# Patient Record
Sex: MALE | Race: WHITE | NOT HISPANIC OR LATINO | Employment: OTHER | URBAN - METROPOLITAN AREA
[De-identification: names, ages, dates, MRNs, and addresses within clinical notes are randomized per-mention and may not be internally consistent; named-entity substitution may affect disease eponyms.]

---

## 2021-07-30 ENCOUNTER — LAB REQUISITION (OUTPATIENT)
Dept: LAB | Facility: HOSPITAL | Age: 71
End: 2021-07-30
Payer: COMMERCIAL

## 2021-07-30 DIAGNOSIS — K63.5 POLYP OF COLON: ICD-10-CM

## 2021-07-30 PROCEDURE — 88305 TISSUE EXAM BY PATHOLOGIST: CPT | Performed by: PATHOLOGY

## 2022-01-26 ENCOUNTER — APPOINTMENT (INPATIENT)
Dept: NON INVASIVE DIAGNOSTICS | Facility: HOSPITAL | Age: 72
DRG: 247 | End: 2022-01-26
Payer: COMMERCIAL

## 2022-01-26 ENCOUNTER — HOSPITAL ENCOUNTER (INPATIENT)
Facility: HOSPITAL | Age: 72
LOS: 1 days | Discharge: HOME HEALTH | DRG: 247 | End: 2022-01-27
Attending: EMERGENCY MEDICINE | Admitting: FAMILY MEDICINE
Payer: COMMERCIAL

## 2022-01-26 ENCOUNTER — APPOINTMENT (EMERGENCY)
Dept: RADIOLOGY | Facility: HOSPITAL | Age: 72
DRG: 247 | End: 2022-01-26
Payer: COMMERCIAL

## 2022-01-26 DIAGNOSIS — I25.119 CORONARY ARTERY DISEASE INVOLVING NATIVE CORONARY ARTERY OF NATIVE HEART WITH ANGINA PECTORIS (HCC): ICD-10-CM

## 2022-01-26 DIAGNOSIS — I21.4 NSTEMI (NON-ST ELEVATED MYOCARDIAL INFARCTION) (HCC): Primary | ICD-10-CM

## 2022-01-26 DIAGNOSIS — Z95.5 S/P CORONARY ARTERY STENT PLACEMENT: ICD-10-CM

## 2022-01-26 DIAGNOSIS — G47.33 OSA (OBSTRUCTIVE SLEEP APNEA): ICD-10-CM

## 2022-01-26 PROBLEM — E11.9 TYPE 2 DIABETES MELLITUS, WITHOUT LONG-TERM CURRENT USE OF INSULIN (HCC): Status: ACTIVE | Noted: 2022-01-26

## 2022-01-26 PROBLEM — N17.9 AKI (ACUTE KIDNEY INJURY) (HCC): Status: ACTIVE | Noted: 2022-01-26

## 2022-01-26 PROBLEM — N18.9 CKD (CHRONIC KIDNEY DISEASE): Status: ACTIVE | Noted: 2022-01-26

## 2022-01-26 PROBLEM — N40.0 BPH (BENIGN PROSTATIC HYPERPLASIA): Status: ACTIVE | Noted: 2022-01-26

## 2022-01-26 LAB
2HR DELTA HS TROPONIN: 676 NG/L
ALBUMIN SERPL BCP-MCNC: 3.3 G/DL (ref 3.5–5)
ALP SERPL-CCNC: 119 U/L (ref 46–116)
ALT SERPL W P-5'-P-CCNC: 41 U/L (ref 12–78)
ANION GAP SERPL CALCULATED.3IONS-SCNC: 8 MMOL/L (ref 4–13)
AORTIC ROOT: 3.4 CM
AORTIC VALVE MEAN VELOCITY: 10.2 M/S
APICAL FOUR CHAMBER EJECTION FRACTION: 67 %
APTT PPP: 26 SECONDS (ref 23–37)
ASCENDING AORTA: 3.5 CM (ref 2.28–3.42)
AST SERPL W P-5'-P-CCNC: 22 U/L (ref 5–45)
ATRIAL RATE: 64 BPM
ATRIAL RATE: 69 BPM
ATRIAL RATE: 77 BPM
AV LVOT MEAN GRADIENT: 2 MMHG
AV LVOT PEAK GRADIENT: 4 MMHG
AV MEAN GRADIENT: 5 MMHG
AV PEAK GRADIENT: 7 MMHG
AV VELOCITY RATIO: 0.78
BASOPHILS # BLD AUTO: 0.06 THOUSANDS/ΜL (ref 0–0.1)
BASOPHILS NFR BLD AUTO: 1 % (ref 0–1)
BILIRUB SERPL-MCNC: 0.92 MG/DL (ref 0.2–1)
BUN SERPL-MCNC: 22 MG/DL (ref 5–25)
CALCIUM ALBUM COR SERPL-MCNC: 9.3 MG/DL (ref 8.3–10.1)
CALCIUM SERPL-MCNC: 8.7 MG/DL (ref 8.3–10.1)
CARDIAC TROPONIN I PNL SERPL HS: 279 NG/L
CARDIAC TROPONIN I PNL SERPL HS: 955 NG/L
CHLORIDE SERPL-SCNC: 104 MMOL/L (ref 100–108)
CO2 SERPL-SCNC: 28 MMOL/L (ref 21–32)
CREAT SERPL-MCNC: 1.34 MG/DL (ref 0.6–1.3)
DOP CALC AO PEAK VEL: 1.34 M/S
DOP CALC AO VTI: 27.67 CM
DOP CALC LVOT PEAK VEL VTI: 23.23 CM
DOP CALC LVOT PEAK VEL: 1.05 M/S
E WAVE DECELERATION TIME: 202 MS
EOSINOPHIL # BLD AUTO: 0.17 THOUSAND/ΜL (ref 0–0.61)
EOSINOPHIL NFR BLD AUTO: 2 % (ref 0–6)
ERYTHROCYTE [DISTWIDTH] IN BLOOD BY AUTOMATED COUNT: 12.5 % (ref 11.6–15.1)
ERYTHROCYTE [DISTWIDTH] IN BLOOD BY AUTOMATED COUNT: 12.5 % (ref 11.6–15.1)
FLUAV RNA RESP QL NAA+PROBE: NEGATIVE
FLUBV RNA RESP QL NAA+PROBE: NEGATIVE
FRACTIONAL SHORTENING: 32 % (ref 28–44)
GFR SERPL CREATININE-BSD FRML MDRD: 53 ML/MIN/1.73SQ M
GLUCOSE SERPL-MCNC: 110 MG/DL (ref 65–140)
GLUCOSE SERPL-MCNC: 127 MG/DL (ref 65–140)
GLUCOSE SERPL-MCNC: 140 MG/DL (ref 65–140)
GLUCOSE SERPL-MCNC: 156 MG/DL (ref 65–140)
GLUCOSE SERPL-MCNC: 232 MG/DL (ref 65–140)
HCT VFR BLD AUTO: 42.4 % (ref 36.5–49.3)
HCT VFR BLD AUTO: 42.7 % (ref 36.5–49.3)
HGB BLD-MCNC: 14.5 G/DL (ref 12–17)
HGB BLD-MCNC: 14.5 G/DL (ref 12–17)
IMM GRANULOCYTES # BLD AUTO: 0.03 THOUSAND/UL (ref 0–0.2)
IMM GRANULOCYTES NFR BLD AUTO: 0 % (ref 0–2)
INR PPP: 1.01 (ref 0.84–1.19)
INTERVENTRICULAR SEPTUM IN DIASTOLE (PARASTERNAL SHORT AXIS VIEW): 1.2 CM (ref 0.59–1.1)
KCT BLD-ACNC: 245 SEC (ref 89–137)
LEFT ATRIUM AREA SYSTOLE SINGLE PLANE A4C: 15 CM2
LEFT ATRIUM SIZE: 4 CM
LEFT INTERNAL DIMENSION IN SYSTOLE: 3.4 CM (ref 2.1–4)
LEFT VENTRICULAR INTERNAL DIMENSION IN DIASTOLE: 5 CM (ref 12.7–18.94)
LEFT VENTRICULAR POSTERIOR WALL IN END DIASTOLE: 1.2 CM (ref 0.57–1.08)
LEFT VENTRICULAR STROKE VOLUME: 69 ML
LYMPHOCYTES # BLD AUTO: 1.37 THOUSANDS/ΜL (ref 0.6–4.47)
LYMPHOCYTES NFR BLD AUTO: 14 % (ref 14–44)
MCH RBC QN AUTO: 31.7 PG (ref 26.8–34.3)
MCH RBC QN AUTO: 32.2 PG (ref 26.8–34.3)
MCHC RBC AUTO-ENTMCNC: 34 G/DL (ref 31.4–37.4)
MCHC RBC AUTO-ENTMCNC: 34.2 G/DL (ref 31.4–37.4)
MCV RBC AUTO: 93 FL (ref 82–98)
MCV RBC AUTO: 94 FL (ref 82–98)
MONOCYTES # BLD AUTO: 0.59 THOUSAND/ΜL (ref 0.17–1.22)
MONOCYTES NFR BLD AUTO: 6 % (ref 4–12)
MV E'TISSUE VEL-SEP: 9 CM/S
MV PEAK A VEL: 0.47 M/S
MV PEAK E VEL: 74 CM/S
MV STENOSIS PRESSURE HALF TIME: 0 MS
NEUTROPHILS # BLD AUTO: 7.52 THOUSANDS/ΜL (ref 1.85–7.62)
NEUTS SEG NFR BLD AUTO: 77 % (ref 43–75)
NRBC BLD AUTO-RTO: 0 /100 WBCS
NT-PROBNP SERPL-MCNC: 54 PG/ML
P AXIS: 43 DEGREES
P AXIS: 44 DEGREES
P AXIS: 49 DEGREES
PLATELET # BLD AUTO: 139 THOUSANDS/UL (ref 149–390)
PLATELET # BLD AUTO: 142 THOUSANDS/UL (ref 149–390)
PMV BLD AUTO: 10.3 FL (ref 8.9–12.7)
PMV BLD AUTO: 10.3 FL (ref 8.9–12.7)
POTASSIUM SERPL-SCNC: 4.6 MMOL/L (ref 3.5–5.3)
PR INTERVAL: 168 MS
PR INTERVAL: 172 MS
PR INTERVAL: 178 MS
PROT SERPL-MCNC: 6.5 G/DL (ref 6.4–8.2)
PROTHROMBIN TIME: 12.9 SECONDS (ref 11.6–14.5)
QRS AXIS: 10 DEGREES
QRS AXIS: 18 DEGREES
QRS AXIS: 5 DEGREES
QRSD INTERVAL: 88 MS
QRSD INTERVAL: 92 MS
QRSD INTERVAL: 94 MS
QT INTERVAL: 330 MS
QT INTERVAL: 364 MS
QT INTERVAL: 366 MS
QTC INTERVAL: 373 MS
QTC INTERVAL: 377 MS
QTC INTERVAL: 390 MS
RBC # BLD AUTO: 4.51 MILLION/UL (ref 3.88–5.62)
RBC # BLD AUTO: 4.58 MILLION/UL (ref 3.88–5.62)
RIGHT ATRIUM AREA SYSTOLE A4C: 16.2 CM2
RIGHT VENTRICLE ID DIMENSION: 4 CM
RSV RNA RESP QL NAA+PROBE: NEGATIVE
SARS-COV-2 RNA RESP QL NAA+PROBE: NEGATIVE
SL CV LV EF: 60
SL CV PED ECHO LEFT VENTRICLE DIASTOLIC VOLUME (MOD BIPLANE) 2D: 116 ML
SL CV PED ECHO LEFT VENTRICLE SYSTOLIC VOLUME (MOD BIPLANE) 2D: 47 ML
SODIUM SERPL-SCNC: 140 MMOL/L (ref 136–145)
SPECIMEN SOURCE: ABNORMAL
T WAVE AXIS: -17 DEGREES
T WAVE AXIS: -21 DEGREES
T WAVE AXIS: -27 DEGREES
VENTRICULAR RATE: 64 BPM
VENTRICULAR RATE: 69 BPM
VENTRICULAR RATE: 77 BPM
WBC # BLD AUTO: 9.63 THOUSAND/UL (ref 4.31–10.16)
WBC # BLD AUTO: 9.74 THOUSAND/UL (ref 4.31–10.16)
Z-SCORE OF ASCENDING AORTA: 2.26
Z-SCORE OF INTERVENTRICULAR SEPTUM IN END DIASTOLE: 2.74
Z-SCORE OF LEFT VENTRICULAR DIMENSION IN END SYSTOLE: -11.32
Z-SCORE OF LEFT VENTRICULAR POSTERIOR WALL IN END DIASTOLE: 2.85

## 2022-01-26 PROCEDURE — 94760 N-INVAS EAR/PLS OXIMETRY 1: CPT

## 2022-01-26 PROCEDURE — C1769 GUIDE WIRE: HCPCS | Performed by: INTERNAL MEDICINE

## 2022-01-26 PROCEDURE — 82948 REAGENT STRIP/BLOOD GLUCOSE: CPT

## 2022-01-26 PROCEDURE — C9600 PERC DRUG-EL COR STENT SING: HCPCS | Performed by: INTERNAL MEDICINE

## 2022-01-26 PROCEDURE — 71045 X-RAY EXAM CHEST 1 VIEW: CPT

## 2022-01-26 PROCEDURE — 94660 CPAP INITIATION&MGMT: CPT

## 2022-01-26 PROCEDURE — 92928 PRQ TCAT PLMT NTRAC ST 1 LES: CPT | Performed by: INTERNAL MEDICINE

## 2022-01-26 PROCEDURE — 93571 IV DOP VEL&/PRESS C FLO 1ST: CPT | Performed by: INTERNAL MEDICINE

## 2022-01-26 PROCEDURE — C1874 STENT, COATED/COV W/DEL SYS: HCPCS | Performed by: INTERNAL MEDICINE

## 2022-01-26 PROCEDURE — 84484 ASSAY OF TROPONIN QUANT: CPT | Performed by: PHYSICIAN ASSISTANT

## 2022-01-26 PROCEDURE — 99152 MOD SED SAME PHYS/QHP 5/>YRS: CPT | Performed by: INTERNAL MEDICINE

## 2022-01-26 PROCEDURE — 93005 ELECTROCARDIOGRAM TRACING: CPT

## 2022-01-26 PROCEDURE — 85610 PROTHROMBIN TIME: CPT | Performed by: INTERNAL MEDICINE

## 2022-01-26 PROCEDURE — 99223 1ST HOSP IP/OBS HIGH 75: CPT | Performed by: INTERNAL MEDICINE

## 2022-01-26 PROCEDURE — 99285 EMERGENCY DEPT VISIT HI MDM: CPT | Performed by: PHYSICIAN ASSISTANT

## 2022-01-26 PROCEDURE — 93306 TTE W/DOPPLER COMPLETE: CPT | Performed by: INTERNAL MEDICINE

## 2022-01-26 PROCEDURE — C1894 INTRO/SHEATH, NON-LASER: HCPCS | Performed by: INTERNAL MEDICINE

## 2022-01-26 PROCEDURE — 93010 ELECTROCARDIOGRAM REPORT: CPT | Performed by: INTERNAL MEDICINE

## 2022-01-26 PROCEDURE — 83880 ASSAY OF NATRIURETIC PEPTIDE: CPT | Performed by: PHYSICIAN ASSISTANT

## 2022-01-26 PROCEDURE — C1725 CATH, TRANSLUMIN NON-LASER: HCPCS | Performed by: INTERNAL MEDICINE

## 2022-01-26 PROCEDURE — 99223 1ST HOSP IP/OBS HIGH 75: CPT | Performed by: FAMILY MEDICINE

## 2022-01-26 PROCEDURE — 99285 EMERGENCY DEPT VISIT HI MDM: CPT

## 2022-01-26 PROCEDURE — 93454 CORONARY ARTERY ANGIO S&I: CPT | Performed by: INTERNAL MEDICINE

## 2022-01-26 PROCEDURE — 027034Z DILATION OF CORONARY ARTERY, ONE ARTERY WITH DRUG-ELUTING INTRALUMINAL DEVICE, PERCUTANEOUS APPROACH: ICD-10-PCS | Performed by: INTERNAL MEDICINE

## 2022-01-26 PROCEDURE — 85347 COAGULATION TIME ACTIVATED: CPT

## 2022-01-26 PROCEDURE — 93306 TTE W/DOPPLER COMPLETE: CPT

## 2022-01-26 PROCEDURE — 99153 MOD SED SAME PHYS/QHP EA: CPT | Performed by: INTERNAL MEDICINE

## 2022-01-26 PROCEDURE — 85730 THROMBOPLASTIN TIME PARTIAL: CPT | Performed by: INTERNAL MEDICINE

## 2022-01-26 PROCEDURE — C1887 CATHETER, GUIDING: HCPCS | Performed by: INTERNAL MEDICINE

## 2022-01-26 PROCEDURE — 85027 COMPLETE CBC AUTOMATED: CPT | Performed by: INTERNAL MEDICINE

## 2022-01-26 PROCEDURE — B211YZZ FLUOROSCOPY OF MULTIPLE CORONARY ARTERIES USING OTHER CONTRAST: ICD-10-PCS | Performed by: INTERNAL MEDICINE

## 2022-01-26 PROCEDURE — 80053 COMPREHEN METABOLIC PANEL: CPT | Performed by: PHYSICIAN ASSISTANT

## 2022-01-26 PROCEDURE — 4A023N7 MEASUREMENT OF CARDIAC SAMPLING AND PRESSURE, LEFT HEART, PERCUTANEOUS APPROACH: ICD-10-PCS | Performed by: INTERNAL MEDICINE

## 2022-01-26 PROCEDURE — 36415 COLL VENOUS BLD VENIPUNCTURE: CPT | Performed by: PHYSICIAN ASSISTANT

## 2022-01-26 PROCEDURE — 0241U HB NFCT DS VIR RESP RNA 4 TRGT: CPT | Performed by: PHYSICIAN ASSISTANT

## 2022-01-26 PROCEDURE — 85025 COMPLETE CBC W/AUTO DIFF WBC: CPT | Performed by: PHYSICIAN ASSISTANT

## 2022-01-26 DEVICE — XIENCE SKYPOINT™ EVEROLIMUS ELUTING CORONARY STENT SYSTEM 4.00 MM X 28 MM / RAPID-EXCHANGE
Type: IMPLANTABLE DEVICE | Site: CORONARY | Status: FUNCTIONAL
Brand: XIENCE SKYPOINT™

## 2022-01-26 RX ORDER — OXYBUTYNIN CHLORIDE 5 MG/1
5 TABLET ORAL 2 TIMES DAILY
Status: DISCONTINUED | OUTPATIENT
Start: 2022-01-26 | End: 2022-01-27 | Stop reason: HOSPADM

## 2022-01-26 RX ORDER — TIMOLOL MALEATE 5 MG/ML
1 SOLUTION/ DROPS OPHTHALMIC 3 TIMES DAILY
Status: DISCONTINUED | OUTPATIENT
Start: 2022-01-26 | End: 2022-01-27 | Stop reason: HOSPADM

## 2022-01-26 RX ORDER — METOPROLOL SUCCINATE 25 MG/1
25 TABLET, EXTENDED RELEASE ORAL DAILY
Status: DISCONTINUED | OUTPATIENT
Start: 2022-01-26 | End: 2022-01-27 | Stop reason: HOSPADM

## 2022-01-26 RX ORDER — MIDAZOLAM HYDROCHLORIDE 2 MG/2ML
INJECTION, SOLUTION INTRAMUSCULAR; INTRAVENOUS AS NEEDED
Status: DISCONTINUED | OUTPATIENT
Start: 2022-01-26 | End: 2022-01-26 | Stop reason: HOSPADM

## 2022-01-26 RX ORDER — FENTANYL CITRATE 50 UG/ML
INJECTION, SOLUTION INTRAMUSCULAR; INTRAVENOUS AS NEEDED
Status: DISCONTINUED | OUTPATIENT
Start: 2022-01-26 | End: 2022-01-26 | Stop reason: HOSPADM

## 2022-01-26 RX ORDER — NITROGLYCERIN 20 MG/100ML
INJECTION INTRAVENOUS AS NEEDED
Status: DISCONTINUED | OUTPATIENT
Start: 2022-01-26 | End: 2022-01-26 | Stop reason: HOSPADM

## 2022-01-26 RX ORDER — ATORVASTATIN CALCIUM 40 MG/1
40 TABLET, FILM COATED ORAL EVERY EVENING
Status: DISCONTINUED | OUTPATIENT
Start: 2022-01-26 | End: 2022-01-27 | Stop reason: HOSPADM

## 2022-01-26 RX ORDER — HEPARIN SODIUM 1000 [USP'U]/ML
4000 INJECTION, SOLUTION INTRAVENOUS; SUBCUTANEOUS ONCE
Status: COMPLETED | OUTPATIENT
Start: 2022-01-26 | End: 2022-01-26

## 2022-01-26 RX ORDER — ASPIRIN 81 MG/1
81 TABLET, CHEWABLE ORAL DAILY
Status: DISCONTINUED | OUTPATIENT
Start: 2022-01-27 | End: 2022-01-27 | Stop reason: HOSPADM

## 2022-01-26 RX ORDER — ONDANSETRON 2 MG/ML
4 INJECTION INTRAMUSCULAR; INTRAVENOUS EVERY 6 HOURS PRN
Status: DISCONTINUED | OUTPATIENT
Start: 2022-01-26 | End: 2022-01-27 | Stop reason: HOSPADM

## 2022-01-26 RX ORDER — LIDOCAINE WITH 8.4% SOD BICARB 0.9%(10ML)
SYRINGE (ML) INJECTION AS NEEDED
Status: DISCONTINUED | OUTPATIENT
Start: 2022-01-26 | End: 2022-01-26 | Stop reason: HOSPADM

## 2022-01-26 RX ORDER — HEPARIN SODIUM 1000 [USP'U]/ML
4000 INJECTION, SOLUTION INTRAVENOUS; SUBCUTANEOUS
Status: DISCONTINUED | OUTPATIENT
Start: 2022-01-26 | End: 2022-01-26

## 2022-01-26 RX ORDER — ACETAMINOPHEN 325 MG/1
650 TABLET ORAL EVERY 6 HOURS PRN
Status: DISCONTINUED | OUTPATIENT
Start: 2022-01-26 | End: 2022-01-27 | Stop reason: HOSPADM

## 2022-01-26 RX ORDER — SODIUM CHLORIDE 9 MG/ML
125 INJECTION, SOLUTION INTRAVENOUS CONTINUOUS
Status: DISPENSED | OUTPATIENT
Start: 2022-01-26 | End: 2022-01-26

## 2022-01-26 RX ORDER — NITROGLYCERIN 0.4 MG/1
0.4 TABLET SUBLINGUAL
Status: DISCONTINUED | OUTPATIENT
Start: 2022-01-26 | End: 2022-01-27 | Stop reason: HOSPADM

## 2022-01-26 RX ORDER — HEPARIN SODIUM 1000 [USP'U]/ML
INJECTION, SOLUTION INTRAVENOUS; SUBCUTANEOUS AS NEEDED
Status: DISCONTINUED | OUTPATIENT
Start: 2022-01-26 | End: 2022-01-26 | Stop reason: HOSPADM

## 2022-01-26 RX ORDER — SODIUM CHLORIDE 9 MG/ML
75 INJECTION, SOLUTION INTRAVENOUS CONTINUOUS
Status: DISCONTINUED | OUTPATIENT
Start: 2022-01-26 | End: 2022-01-26

## 2022-01-26 RX ORDER — HEPARIN SODIUM 10000 [USP'U]/100ML
3-20 INJECTION, SOLUTION INTRAVENOUS
Status: DISCONTINUED | OUTPATIENT
Start: 2022-01-26 | End: 2022-01-26

## 2022-01-26 RX ORDER — SODIUM CHLORIDE 9 MG/ML
125 INJECTION, SOLUTION INTRAVENOUS CONTINUOUS
Status: DISCONTINUED | OUTPATIENT
Start: 2022-01-26 | End: 2022-01-26

## 2022-01-26 RX ORDER — HYDRALAZINE HYDROCHLORIDE 20 MG/ML
5 INJECTION INTRAMUSCULAR; INTRAVENOUS EVERY 6 HOURS PRN
Status: DISCONTINUED | OUTPATIENT
Start: 2022-01-26 | End: 2022-01-27 | Stop reason: HOSPADM

## 2022-01-26 RX ORDER — HEPARIN SODIUM 1000 [USP'U]/ML
2000 INJECTION, SOLUTION INTRAVENOUS; SUBCUTANEOUS
Status: DISCONTINUED | OUTPATIENT
Start: 2022-01-26 | End: 2022-01-26

## 2022-01-26 RX ORDER — ACETAMINOPHEN 325 MG/1
TABLET ORAL
Status: COMPLETED
Start: 2022-01-26 | End: 2022-01-26

## 2022-01-26 RX ADMIN — ACETAMINOPHEN 650 MG: 325 TABLET, FILM COATED ORAL at 17:13

## 2022-01-26 RX ADMIN — ATORVASTATIN CALCIUM 40 MG: 40 TABLET, FILM COATED ORAL at 18:31

## 2022-01-26 RX ADMIN — SODIUM CHLORIDE 75 ML/HR: 0.9 INJECTION, SOLUTION INTRAVENOUS at 09:54

## 2022-01-26 RX ADMIN — HEPARIN SODIUM AND DEXTROSE 11.1 UNITS/KG/HR: 10000; 5 INJECTION INTRAVENOUS at 06:36

## 2022-01-26 RX ADMIN — SODIUM CHLORIDE 125 ML/HR: 0.9 INJECTION, SOLUTION INTRAVENOUS at 18:06

## 2022-01-26 RX ADMIN — OXYBUTYNIN CHLORIDE 5 MG: 5 TABLET ORAL at 18:32

## 2022-01-26 RX ADMIN — ACETAMINOPHEN 650 MG: 325 TABLET ORAL at 17:13

## 2022-01-26 RX ADMIN — SODIUM CHLORIDE 125 ML/HR: 0.9 INJECTION, SOLUTION INTRAVENOUS at 14:28

## 2022-01-26 RX ADMIN — TIMOLOL MALEATE 1 DROP: 5 SOLUTION/ DROPS OPHTHALMIC at 21:56

## 2022-01-26 RX ADMIN — HEPARIN SODIUM 4000 UNITS: 1000 INJECTION INTRAVENOUS; SUBCUTANEOUS at 06:37

## 2022-01-26 RX ADMIN — OXYBUTYNIN CHLORIDE 5 MG: 5 TABLET ORAL at 09:14

## 2022-01-26 RX ADMIN — TICAGRELOR 90 MG: 90 TABLET ORAL at 21:55

## 2022-01-26 RX ADMIN — BISMUTH SUBSALICYLATE 524 MG: 525 LIQUID ORAL at 17:31

## 2022-01-26 RX ADMIN — METOPROLOL SUCCINATE 25 MG: 25 TABLET, FILM COATED, EXTENDED RELEASE ORAL at 09:14

## 2022-01-26 NOTE — ASSESSMENT & PLAN NOTE
Lab Results   Component Value Date    EGFR 53 01/26/2022    CREATININE 1 34 (H) 01/26/2022     · Patients creatinine mildly elevated at 1 34 on admission  · Per chart review baseline appears to be 1 0 (12/1/21)  · Does not meet YEMI criteria at this time  · Avoid nephrotoxic agents and hypotension  · Monitor closely with BMP

## 2022-01-26 NOTE — DISCHARGE INSTRUCTIONS
After Radial Heart Catheterization   WHAT YOU NEED TO KNOW:   What will happen after a radial heart catheterization? · You will be attached to a heart monitor until you are fully awake  A heart monitor is an EKG that stays on continuously to record your heart's electrical activity  Healthcare providers will monitor your vital signs and pulses in your arm  They will frequently check your pressure bandage for bleeding or swelling  · You may have a band wrapped tightly around your wrist  The band puts pressure on your wound and helps prevent bleeding  A healthcare provider can put air into the band or remove air from the band  A healthcare provider will gradually remove air from the band and decrease pressure on your wrist  The band may be removed in 2 hours or when your wound stops bleeding  · You will need to keep your wrist straight for 2 to 4 hours  Do not  push or pull with your arm  Arm movements can cause serious bleeding  After you are monitored for several hours, you may go home or may need to stay in the hospital overnight  What do I need to know before I go home? · Care for your wound as directed  Remove the pressure bandage in 24 hours or as directed  Mild bruising is normal and expected  A small bandage can be placed on your wound after you remove the pressure bandage  Do not put powders, lotions, or creams on your wound  They may cause your wound to get infected  Monitor your wound every day for signs of infection, such as redness, swelling, or pus  · Shower the day after your procedure or as directed  Remove your pressure bandage before you shower  Do not take baths or go in hot tubs or pools  Carefully wash the wound with soap and water  Pat the area dry  A small bandage can be placed on your wound after you shower  · Apply firm, steady pressure to your wound if it bleeds  Apply pressure with a clean gauze or towel for 5 to 10 minutes   Call 911 if bleeding becomes heavy or does not stop  · Drink liquids as directed  Liquids will help flush the contrast liquid from your body  Ask how much liquid to drink each day and which liquids are best for you  · Do not lift anything heavier than 5 pounds until directed by your healthcare provider  Heavy lifting can put stress on your wound and cause bleeding  Do not push or pull with the arm that was used for the procedure  Do not do vigorous activity for at least 48 hours  Vigorous activity may cause bleeding from your wound  Rest and do quiet activities  Take short walks around the house to prevent a blood clot  Ask your healthcare provider when you can return to your normal activities  · Do not drive or return to work until your healthcare provider says it is okay  Your healthcare provider may tell you to wait 48 hours before you drive to decrease your risk for bleeding  You may not be able to return to work for at least 2 days after your procedure if your job involves heavy lifting  What medicines may I need? You may need any of the following:  · Blood thinners  help prevent blood clots  Clots can cause strokes, heart attacks, and death  The following are general safety guidelines to follow while you are taking a blood thinner:    ? Watch for bleeding and bruising while you take blood thinners  Watch for bleeding from your gums or nose  Watch for blood in your urine and bowel movements  Use a soft washcloth on your skin, and a soft toothbrush to brush your teeth  This can keep your skin and gums from bleeding  If you shave, use an electric shaver  Do not play contact sports  ? Tell your dentist and other healthcare providers that you take a blood thinner  Wear a bracelet or necklace that says you take this medicine  ? Do not start or stop any other medicines unless your healthcare provider tells you to  Many medicines cannot be used with blood thinners      ? Take your blood thinner exactly as prescribed by your healthcare provider  Do not skip does or take less than prescribed  Tell your provider right away if you forget to take your blood thinner, or if you take too much  ? Warfarin  is a blood thinner that you may need to take  The following are things you should be aware of if you take warfarin:     § Foods and medicines can affect the amount of warfarin in your blood  Do not make major changes to your diet while you take warfarin  Warfarin works best when you eat about the same amount of vitamin K every day  Vitamin K is found in green leafy vegetables and certain other foods  Ask for more information about what to eat when you are taking warfarin  § You will need to see your healthcare provider for follow-up visits when you are on warfarin  You will need regular blood tests  These tests are used to decide how much medicine you need  · Acetaminophen  helps decrease pain and fever  This medicine is available without a doctor's order  Ask how much medicine is safe to take, and how often to take it  Acetaminophen can cause liver damage if not taken correctly  · Take your medicine as directed  Contact your healthcare provider if you think your medicine is not helping or if you have side effects  Tell him or her if you are allergic to any medicine  Keep a list of the medicines, vitamins, and herbs you take  Include the amounts, and when and why you take them  Bring the list or the pill bottles to follow-up visits  Carry your medicine list with you in case of an emergency  Call your local emergency number (911 in the [address]) if:   · You have chest pain  · You have any of the following signs of a heart attack:      ?  Squeezing, pressure, or pain in your chest    ? You may  also have any of the following:     § Discomfort or pain in your back, neck, jaw, stomach, or arm    § Shortness of breath    § Nausea or vomiting    § Lightheadedness or a sudden cold sweat    · You have any of the following signs of a stroke:      ? Numbness or drooping on one side of your face     ? Weakness in an arm or leg    ? Confusion or difficulty speaking    ? Dizziness, a severe headache, or vision loss    · You cough up blood  · You have trouble breathing  · You cannot stop the bleeding from your wound even after you hold firm pressure for 10 minutes  When should I call my doctor? · You have a fever or chills  · Blood soaks through your bandage  · Your stitches come apart  · Your hand or arm feels numb, cool, or looks pale  · Your wound gets swollen quickly  · Your wound is red, swollen, or draining pus  · Your wound looks more bruised or you have new bruising on the side of your wrist      · You have nausea or are vomiting  · Your skin is itchy, swollen, or you have a rash  · You have questions or concerns about your condition or care  Healthy living tips: The following are general healthy guidelines  If your chest pain is caused by a heart problem, your healthcare provider will give you specific guidelines to follow  · Manage other health conditions  Diabetes and high cholesterol increases your risk for another heart attack and stroke  Talk to your healthcare provider about your management plan  He or she will make a plan that helps you manage your conditions  · Do not smoke  Nicotine and other chemicals in cigarettes and cigars can cause lung and heart damage  Ask your healthcare provider for information if you currently smoke and need help to quit  E-cigarettes or smokeless tobacco still contain nicotine  Talk to your healthcare provider before you use these products  · Eat a variety of healthy, low-fat, low-salt foods  Healthy foods include fruits, vegetables, whole-grain breads, low-fat dairy products, beans, lean meats, and fish  Ask for more information about a heart healthy diet  · Drink plenty of water every day  Your body is made of mostly water   Water helps your body to control your temperature and blood pressure  Ask your healthcare provider how much water you should drink every day  · Ask about activity  Your healthcare provider will tell you which activities to limit or avoid  Ask when you can drive, return to work, and have sex  Ask about the best exercise plan for you  · Maintain a healthy weight  Ask your healthcare provider how much you should weigh  Ask him or her to help you create a weight loss plan if you are overweight  · Get the flu and pneumonia vaccines  All adults should get the influenza (flu) vaccine  Get it every year as soon as it becomes available  The pneumococcal vaccine is given to adults aged 72 years or older  The vaccine is given every 5 years to prevent pneumococcal disease, such as pneumonia  If you have a stent:   · Carry your stent card with you at all times  · Let all healthcare providers know that you have a stent  CARE AGREEMENT:   You have the right to help plan your care  Learn about your health condition and how it may be treated  Discuss treatment options with your healthcare providers to decide what care you want to receive  You always have the right to refuse treatment  The above information is an  only  It is not intended as medical advice for individual conditions or treatments  Talk to your doctor, nurse or pharmacist before following any medical regimen to see if it is safe and effective for you  © Copyright [employer] 2021 Information is for End User's use only and may not be sold, redistributed or otherwise used for commercial purposes   All illustrations and images included in CareNotes® are the copyrighted property of [employer]  or [facility] [employer]

## 2022-01-26 NOTE — Clinical Note
The left coronary artery was selectively engaged, injected and visualized  Multiple views of the injected vessel were taken

## 2022-01-26 NOTE — ASSESSMENT & PLAN NOTE
Lab Results   Component Value Date    HGBA1C 6 6 (H) 12/01/2021     · Hold home oral hypoglycemic regimen  · Start on SSI with accu checks  · Hypoglycemia protocol

## 2022-01-26 NOTE — CASE MANAGEMENT
Case Management Assessment & Discharge Planning Note    Patient name Alejandro Shown  Location [place] CATH [facility]/[place] CATH * MRN [id number]  : 1950 Date 2022       Current Admission Date: 2022  Current Admission Diagnosis:NSTEMI (non-ST elevated myocardial infarction) [facility])   Patient Active Problem List    Diagnosis Date Noted    NSTEMI (non-ST elevated myocardial infarction) ([address] ) 2022    Type 2 diabetes mellitus, without long-term current use of insulin ([facility] ) 2022    BPH (benign prostatic hyperplasia) 2022    KASEY (obstructive sleep apnea) 2022    CKD (chronic kidney disease) 2022      LOS (days): 0  Geometric Mean LOS (GMLOS) (days): 1 80  Days to GMLOS:1 5     OBJECTIVE:    Risk of Unplanned Readmission Score: 9         Current admission status: Inpatient       Preferred Pharmacy: No Pharmacies Listed  Primary Care Provider: No primary care provider on file  [facility]  Secondary Insurance:     ASSESSMENT:  Santiago Higgins [facility]     enrique prather [facility] Care Representative - Spouse   Primary Phone: [phone] (Mobile)               Advance Directives  Does patient have a [address]?: No  Was patient offered paperwork?: Yes (wife stated she already has the paperwork)  Does patient currently have a Health Care decision maker?: Yes, please see Health Care Proxy section  Does patient have Advance Directives?: No  Was patient offered paperwork?: Yes         Readmission Root Cause  30 Day Readmission: No    Patient Information  Admitted from[de-identified] Home  Mental Status: Alert  During Assessment patient was accompanied by: Spouse  Assessment information provided by[de-identified] Patient  Primary Caregiver: Self  Support Systems: Spouse/significant other  [place] of Residence: [place] [address] do you live in?: [address] entry access options   Select all that apply : Stairs  Number of steps to enter home : 10  Do the steps have railings?: Yes  Type of Current Residence: 2 story home  Upon entering residence, is there a bedroom on the main floor (no further steps)?: No  A bedroom is located on the following floor levels of residence (select all that apply):: 2nd Floor  Upon entering residence, is there a bathroom on the main floor (no further steps)?: Yes  Number of steps to 2nd floor from main floor: One Flight  In the last 12 months, was there a time when you were not able to pay the mortgage or rent on time?: No  In the last 12 months, how many places have you lived?: 1  In the last 12 months, was there a time when you did not have a steady place to sleep or slept in a shelter (including now)?: No  Homeless/housing insecurity resource given?: N/A  Living Arrangements: Lives w/ Spouse/significant other    Activities of Daily Living Prior to Admission  Functional Status: Independent  Completes ADLs independently?: Yes  Ambulates independently?: Yes  Does patient use assisted devices?: No  Does patient currently own DME?: No  Does patient have a history of Outpatient Therapy (PT/OT)?: Yes  Does the patient have a history of Short-Term Rehab?: No  Does patient have a history of HHC?: No  Does patient currently have [facility]?: No         Patient Information Continued  Income Source: Unemployed  Does patient have prescription coverage?: Yes  Within the past 12 months, you worried that your food would run out before you got the money to buy more : Never true  Within the past 12 months, the food you bought just didnt last and you didnt have money to get more : Never true  Food insecurity resource given?: N/A  Does patient receive dialysis treatments?: No  Does patient have a history of substance abuse?: No  Does patient have a history of Mental Health Diagnosis?: No         Means of Transportation  Means of Transport to Appts[de-identified] Drives Self  In the past 12 months, has lack of transportation kept you from medical appointments or from getting medications?: No  In the past 12 months, has lack of transportation kept you from meetings, work, or from getting things needed for daily living?: No  Was application for public transport provided?: N/A        DISCHARGE DETAILS:    Discharge planning discussed with[de-identified] patient     Comments - Freedom of Choice: Patient would like to speak to his family himself, pt has agreed to homecare  CM contacted family/caregiver?: No- see comments                  Requested [address]         Is the patient interested in [facility] at discharge?: Yes  [address] requested[de-identified] [place] Name[de-identified] Other (referrals sent)  [id number] Provider[de-identified] PCP  Home Health Services Needed[de-identified] Heart Failure Management  Homebound Criteria Met[de-identified] Requires the Assistance of Another Person for Safe Ambulation or to Leave the Home  Supporting Clincal Findings[de-identified] Dyspnea with Exertion,Fatigues Easliy in Short Distances    DME Referral Provided  Referral made for DME?: No              Treatment Team Recommendation: Home with [address] [employer]  Discharge Destination Plan[de-identified] Home with [address] [employer]

## 2022-01-26 NOTE — ED PROVIDER NOTES
History  Chief Complaint   Patient presents with    Chest Pain     Pt present to ED via ems from home c/o midsternal chest pain radiating b/l shoulders onset 2 hrs PTA w/one emesis episode, pt describe the pain as "burning sensation" woke him from his sleep, hxof CAD 20 years ago, hx of DM 2, currently denies sob, palpitations, n/v, one nitro and 324mg asp  Patient is a 72-year-old male with a past medical history significant for coronary artery disease, diabetes, hypertension who presents to the emergency department for evaluation of midsternal chest pain described as a burning sensation that woke him up from sleep at about midnight  Patient states that he had 1 episode of vomiting with associated nausea  His pain is significantly improved, however he still does report a dull pain to his chest   He is not having associated shortness of breath  No fevers, chills, diaphoresis, abdominal pain  No other complaints this time  None       Past Medical History:   Diagnosis Date    Coronary artery disease     Diabetes mellitus ([facility] [address] )     Hypertension        Past Surgical History:   Procedure Laterality Date    CARDIAC CATHETERIZATION N/A 1/26/2022    Procedure: Cardiac catheterization;  Surgeon: Haily Skelton MD;  Location: [address] [place] [address] CATH LAB; Service: Cardiology    CARDIAC CATHETERIZATION N/A 1/26/2022    Procedure: Cardiac Coronary Angiogram;  Surgeon: Haily Skelton MD;  Location: [address] [place] [address] CATH LAB; Service: Cardiology    CARDIAC CATHETERIZATION Left 1/26/2022    Procedure: Cardiac Left Heart Cath;  Surgeon: Haily Skelton MD;  Location: [address] [place] [address] CATH LAB; Service: Cardiology    CARDIAC CATHETERIZATION N/A 1/26/2022    Procedure: Cardiac pci;  Surgeon: Haily Skelton MD;  Location: [address] [place] [address] CATH LAB; Service: Cardiology    FL INJECTION RIGHT KNEE (ARTHROGRAM)  2018       History reviewed  No pertinent family history    I have reviewed and agree with the history as documented  E-Cigarette/Vaping    E-Cigarette Use Never User      E-Cigarette/Vaping Substances    Nicotine No     THC No     CBD No     Flavoring No     Other No     Unknown No      Social History     Tobacco Use    Smoking status: Never Smoker    Smokeless tobacco: Never Used   Vaping Use    Vaping Use: Never used   Substance Use Topics    Alcohol use: Not Currently    Drug use: Never       Review of Systems   Constitutional: Negative for chills, diaphoresis and fever  HENT: Negative for congestion, drooling, facial swelling, nosebleeds, sore throat and voice change  Eyes: Negative for discharge and redness  Respiratory: Negative for cough, choking, chest tightness, shortness of breath and stridor  Cardiovascular: Positive for chest pain  Negative for palpitations  Gastrointestinal: Positive for nausea and vomiting  Negative for abdominal pain and diarrhea  Genitourinary: Negative for decreased urine volume, difficulty urinating, dysuria, flank pain, frequency and urgency  Musculoskeletal: Negative for arthralgias, back pain, neck pain and neck stiffness  Skin: Negative for color change, rash and wound  Neurological: Negative for dizziness, syncope, facial asymmetry, weakness, light-headedness, numbness and headaches  Psychiatric/Behavioral: Negative for confusion and suicidal ideas  The patient is not nervous/anxious  All other systems reviewed and are negative  Physical Exam  Physical Exam  Vitals reviewed  Constitutional:       General: He is not in acute distress  Appearance: Normal appearance  He is obese  He is not ill-appearing, toxic-appearing or diaphoretic  HENT:      Head: Normocephalic and atraumatic  Right Ear: External ear normal       Left Ear: External ear normal       Mouth/Throat:      Mouth: Mucous membranes are moist       Pharynx: Oropharynx is clear  No oropharyngeal exudate or posterior oropharyngeal erythema     Eyes:      General: No scleral icterus  Right eye: No discharge  Left eye: No discharge  Extraocular Movements: Extraocular movements intact  Conjunctiva/sclera: Conjunctivae normal       Pupils: Pupils are equal, round, and reactive to light  Cardiovascular:      Rate and Rhythm: Normal rate and regular rhythm  Pulses: Normal pulses  Heart sounds: Normal heart sounds  No murmur heard  No friction rub  No gallop  Pulmonary:      Effort: Pulmonary effort is normal  No respiratory distress  Breath sounds: Normal breath sounds  No stridor  No wheezing, rhonchi or rales  Abdominal:      General: Abdomen is flat  Palpations: Abdomen is soft  Tenderness: There is no abdominal tenderness  There is no right CVA tenderness, left CVA tenderness, guarding or rebound  Musculoskeletal:         General: Normal range of motion  Cervical back: Normal range of motion and neck supple  Right lower leg: No edema  Left lower leg: No edema  Skin:     General: Skin is warm and dry  Capillary Refill: Capillary refill takes less than 2 seconds  Neurological:      General: No focal deficit present  Mental Status: He is alert and oriented to person, place, and time     Psychiatric:         Mood and Affect: Mood normal          Behavior: Behavior normal          Vital Signs  ED Triage Vitals   Temperature Pulse Respirations Blood Pressure SpO2   01/26/22 0309 01/26/22 0309 01/26/22 0309 01/26/22 0309 01/26/22 0309   98 8 °F (37 1 °C) 83 16 132/64 96 %      Temp Source Heart Rate Source Patient Position - Orthostatic VS BP Location FiO2 (%)   01/26/22 0309 01/26/22 0309 01/26/22 0309 01/26/22 0309 01/26/22 2213   Oral Monitor Lying Right arm 21      Pain Score       01/26/22 0309       3           Vitals:    01/26/22 1949 01/27/22 0000 01/27/22 0259 01/27/22 0815   BP: (!) 179/94 163/81 163/78 154/78   Pulse: 64 67 67 67   Patient Position - Orthostatic VS:             Visual Acuity      ED Medications  Medications   sodium chloride 0 9 % infusion (125 mL/hr Intravenous New Bag 1/26/22 1806)   heparin (porcine) injection 4,000 Units (4,000 Units Intravenous Given 1/26/22 0637)   bismuth subsalicylate (PEPTO BISMOL) oral suspension 524 mg (524 mg Oral Given 1/26/22 1731)       Diagnostic Studies  Results Reviewed     Procedure Component Value Units Date/Time    Comprehensive metabolic panel [[id number]]  (Abnormal) Collected: 01/27/22 0551    Lab Status: Final result Specimen: Blood from Arm, Left Updated: 01/27/22 0637     Sodium 139 mmol/L      Potassium 4 0 mmol/L      Chloride 105 mmol/L      CO2 28 mmol/L      ANION GAP 6 mmol/L      BUN 13 mg/dL      Creatinine 1 11 mg/dL      Glucose 136 mg/dL      Calcium 8 7 mg/dL      Corrected Calcium 9 3 mg/dL      AST 51 U/L      ALT 44 U/L      Alkaline Phosphatase 121 U/L      Total Protein 6 4 g/dL      Albumin 3 3 g/dL      Total Bilirubin 1 58 mg/dL      eGFR 67 ml/min/1 73sq m     Narrative:      [place] guidelines for Chronic Kidney Disease (CKD):     Stage 1 with normal or high GFR (GFR > 90 mL/min/1 73 square meters)    Stage 2 Mild CKD (GFR = 60-89 mL/min/1 73 square meters)    Stage 3A Moderate CKD (GFR = 45-59 mL/min/1 73 square meters)    Stage 3B Moderate CKD (GFR = 30-44 mL/min/1 73 square meters)    Stage 4 Severe CKD (GFR = 15-29 mL/min/1 73 square meters)    Stage 5 End Stage CKD (GFR <15 mL/min/1 73 square meters)  Note: GFR calculation is accurate only with a steady state creatinine    Magnesium [[id number]]  (Normal) Collected: 01/27/22 0551    Lab Status: Final result Specimen: Blood from Arm, Left Updated: 01/27/22 0632     Magnesium 1 9 mg/dL     APTT [[id number]]  (Normal) Collected: 01/27/22 0559    Lab Status: Final result Specimen: Blood from Arm, Left Updated: 01/27/22 0618     PTT 27 seconds     CBC (With Platelets) [[id number]]  (Abnormal) Collected: 01/27/22 0551    Lab Status: Final result Specimen: Blood from Arm, Left Updated: 01/27/22 0613     WBC 8 39 Thousand/uL      RBC 4 59 Million/uL      Hemoglobin 15 0 g/dL      Hematocrit 43 3 %      MCV 94 fL      MCH 32 7 pg      MCHC 34 6 g/dL      RDW 12 5 %      Platelets 990 Thousands/uL      MPV 10 4 fL     Fingerstick Glucose (POCT) [[id number]]  (Normal) Collected: 01/26/22 1134    Lab Status: Final result Updated: 01/26/22 1142     POC Glucose 110 mg/dl     Fingerstick Glucose (POCT) [[id number]]  (Normal) Collected: 01/26/22 0824    Lab Status: Final result Updated: 01/26/22 0830     POC Glucose 127 mg/dl     APTT [[id number]]  (Normal) Collected: 01/26/22 0602    Lab Status: Final result Specimen: Blood from Arm, Right Updated: 01/26/22 0626     PTT 26 seconds     Protime-INR [[id number]]  (Normal) Collected: 01/26/22 0602    Lab Status: Final result Specimen: Blood from Arm, Right Updated: 01/26/22 0626     Protime 12 9 seconds      INR 1 01    CBC [[id number]]  (Abnormal) Collected: 01/26/22 0602    Lab Status: Final result Specimen: Blood from Arm, Right Updated: 01/26/22 0621     WBC 9 63 Thousand/uL      RBC 4 51 Million/uL      Hemoglobin 14 5 g/dL      Hematocrit 42 4 %      MCV 94 fL      MCH 32 2 pg      MCHC 34 2 g/dL      RDW 12 5 %      Platelets 256 Thousands/uL      MPV 10 3 fL     HS Troponin I 2hr [[id number]]  (Abnormal) Collected: 01/26/22 0522    Lab Status: Final result Specimen: Blood from Arm, Left Updated: 01/26/22 0601     hs TnI 2hr 955 ng/L      Delta 2hr hsTnI 676 ng/L     COVID/FLU/RSV - 2 hour TAT [[id number]]  (Normal) Collected: 01/26/22 0321    Lab Status: Final result Specimen: Nares from Nose Updated: 01/26/22 0407     SARS-CoV-2 Negative     INFLUENZA A PCR Negative     INFLUENZA B PCR Negative     RSV PCR Negative    Narrative:      FOR PEDIATRIC PATIENTS - copy/paste COVID Guidelines URL to browser: [email or web address]    SARS-CoV-2 assay is a Nucleic Acid Amplification assay intended for the  qualitative detection of nucleic acid from SARS-CoV-2 in nasopharyngeal  swabs  Results are for the presumptive identification of SARS-CoV-2 RNA  Positive results are indicative of infection with SARS-CoV-2, the virus  causing COVID-19, but do not rule out bacterial infection or co-infection  with other viruses  Laboratories within the [place] and its  territories are required to report all positive results to the appropriate  public health authorities  Negative results do not preclude SARS-CoV-2  infection and should not be used as the sole basis for treatment or other  patient management decisions  Negative results must be combined with  clinical observations, patient history, and epidemiological information  This test has not been FDA cleared or approved  This test has been authorized by FDA under an Emergency Use Authorization  (EUA)  This test is only authorized for the duration of time the  declaration that circumstances exist justifying the authorization of the  emergency use of an in vitro diagnostic tests for detection of SARS-CoV-2  virus and/or diagnosis of COVID-19 infection under section 564(b)(1) of  the Act, 21 U  [place]  900OLY-3(Y)(5), unless the authorization is terminated  or revoked sooner  The test has been validated but independent review by FDA  and CLIA is pending  Test performed using [employer] Gene[employer]pert: This RT-PCR assay targets N2,  a region unique to SARS-CoV-2  A conserved region in the E-gene was chosen  for pan-Sarbecovirus detection which includes SARS-CoV-2      HS Troponin 0hr (reflex protocol) [[id number]]  (Abnormal) Collected: 01/26/22 0321    Lab Status: Final result Specimen: Blood from Arm, Left Updated: 01/26/22 0352     hs TnI 0hr 279 ng/L     NT-BNP PRO [[id number]]  (Normal) Collected: 01/26/22 0321    Lab Status: Final result Specimen: Blood from Arm, Left Updated: 01/26/22 0352     NT-proBNP 54 pg/mL Comprehensive metabolic panel [[id number]]  (Abnormal) Collected: 01/26/22 0321    Lab Status: Final result Specimen: Blood from Arm, Left Updated: 01/26/22 0345     Sodium 140 mmol/L      Potassium 4 6 mmol/L      Chloride 104 mmol/L      CO2 28 mmol/L      ANION GAP 8 mmol/L      BUN 22 mg/dL      Creatinine 1 34 mg/dL      Glucose 232 mg/dL      Calcium 8 7 mg/dL      Corrected Calcium 9 3 mg/dL      AST 22 U/L      ALT 41 U/L      Alkaline Phosphatase 119 U/L      Total Protein 6 5 g/dL      Albumin 3 3 g/dL      Total Bilirubin 0 92 mg/dL      eGFR 53 ml/min/1 73sq m     Narrative:      [place] guidelines for Chronic Kidney Disease (CKD):     Stage 1 with normal or high GFR (GFR > 90 mL/min/1 73 square meters)    Stage 2 Mild CKD (GFR = 60-89 mL/min/1 73 square meters)    Stage 3A Moderate CKD (GFR = 45-59 mL/min/1 73 square meters)    Stage 3B Moderate CKD (GFR = 30-44 mL/min/1 73 square meters)    Stage 4 Severe CKD (GFR = 15-29 mL/min/1 73 square meters)    Stage 5 End Stage CKD (GFR <15 mL/min/1 73 square meters)  Note: GFR calculation is accurate only with a steady state creatinine    CBC and differential [[id number]]  (Abnormal) Collected: 01/26/22 0321    Lab Status: Final result Specimen: Blood from Arm, Left Updated: 01/26/22 0338     WBC 9 74 Thousand/uL      RBC 4 58 Million/uL      Hemoglobin 14 5 g/dL      Hematocrit 42 7 %      MCV 93 fL      MCH 31 7 pg      MCHC 34 0 g/dL      RDW 12 5 %      MPV 10 3 fL      Platelets 019 Thousands/uL      nRBC 0 /100 WBCs      Neutrophils Relative 77 %      Immat GRANS % 0 %      Lymphocytes Relative 14 %      Monocytes Relative 6 %      Eosinophils Relative 2 %      Basophils Relative 1 %      Neutrophils Absolute 7 52 Thousands/µL      Immature Grans Absolute 0 03 Thousand/uL      Lymphocytes Absolute 1 37 Thousands/µL      Monocytes Absolute 0 59 Thousand/µL      Eosinophils Absolute 0 17 Thousand/µL      Basophils Absolute 0 06 Thousands/µL                  XR chest 1 view portable   Final Result by Max Haynes MD (01/26 0827)      No acute consolidation or congestion   No pleural effusion or pneumothorax                  Workstation performed: [id number]                    Procedures  Procedures  Conscious Sedation Assessment      Classification Score   ASA Scale Assessment 2-Mild to moderate systemic disease, medically well controlled, with no functional limitation filed at 01/26/2022 1220   Mallampati Classification Class II: soft palate, uvula, fauces visible - No Difficulty filed at 01/26/2022 1220             ED Course  ED Course as of 01/31/22 0222 Wed Jan 26, 2022   0303 EKG reveals sinus rhythm with sinus arrhythmia  No ST or T-wave changes  No previous EKGs available  HEART Risk Score      Most Recent Value   Heart Score Risk Calculator    History 1 Filed at: 01/26/2022 0613   ECG 1 Filed at: 01/26/2022 9355   Age 2 Filed at: 01/26/2022 0372   Risk Factors 2 Filed at: 01/26/2022 3385   Troponin 2 Filed at: 01/26/2022 2180   HEART Score 8 Filed at: 01/26/2022 3329                        SBIRT 22yo+      Most Recent Value   SBIRT (23 yo +)    In order to provide better care to our patients, we are screening all of our patients for alcohol and drug use  Would it be okay to ask you these screening questions? No Filed at: 01/26/2022 0350   Initial Alcohol Screen: US AUDIT-C     1  How often do you have a drink containing alcohol? 0 Filed at: 01/26/2022 0350   2  How many drinks containing alcohol do you have on a typical day you are drinking? 0 Filed at: 01/26/2022 0350   3a  Male UNDER 65: How often do you have five or more drinks on one occasion? 0 Filed at: 01/26/2022 0350   3b  FEMALE Any Age, or MALE 65+: How often do you have 4 or more drinks on one occassion? 0 Filed at: 01/26/2022 0350   Audit-C Score 0 Filed at: 01/26/2022 1013   JIGAR: How many times in the past year have you        Used an illegal drug or used a prescription medication for non-medical reasons? Never Filed at: 01/26/2022 0350                    MDM  Number of Diagnoses or Management Options  NSTEMI (non-ST elevated myocardial infarction) [facility])  Diagnosis management comments: Patient presenting for evaluation of substernal chest pain that woke him up from sleep  He appears comfortable  He is not any acute distress  Vital signs unremarkable  No concerning findings on exam   Patient feeling improved upon arrival to the hospital   EKG revealed normal sinus rhythm with sinus arrhythmia  No ischemic changes  Initial troponin 279  Delta troponin went up to 955  Patient was started on a heparin drip  COVID test negative  Chest x-ray without acute cardiopulmonary disease  Patient having NSTEMI and admitted to Medicine in stable condition         Amount and/or Complexity of Data Reviewed  Clinical lab tests: ordered and reviewed  Tests in the radiology section of CPT®: ordered and reviewed    Patient Progress  Patient progress: stable      Disposition  Final diagnoses:   NSTEMI (non-ST elevated [facility] infarction) [facility]     Time reflects when diagnosis was documented in both MDM as applicable and the Disposition within this note     Time User Action Codes Description Comment    1/26/2022  5:48 AM Elverna Marker Add [I21 4] NSTEMI (non-ST elevated myocardial infarction) ([facility] )     1/26/2022  5:51 AM Agapito Wall Modify [I21 4] NSTEMI (non-ST elevated myocardial infarction) ([facility] [address] )     1/26/2022  9:01 AM Rubens Pedlar Add [I25 119] Coronary artery disease involving native coronary artery of native heart with angina pectoris ([facility] )     1/26/2022  9:29 AM Andrea Hansen Modify [I25 119] Coronary artery disease involving native coronary artery of native heart with angina pectoris ([facility] )     1/26/2022  2:38 PM Bradshaw Pedlar Modify [I25 119] Coronary artery disease involving native coronary artery of native heart with angina pectoris ([facility] ) 1/26/2022  2:47 PM Luis Wong Modify [I25 119] Coronary artery disease involving native coronary artery of native heart with angina pectoris ([facility] )     1/26/2022  5:37 PM Luis Wong Modify [I25 119] Coronary artery disease involving native coronary artery of native heart with angina pectoris ([facility] )     1/27/2022 10:07 AM Dominga Narvaez Add [Z95 5] S/P coronary artery stent placement     1/27/2022 12:17 PM LincolnDominga ayala Add [G47 33] KASEY (obstructive sleep apnea)       ED Disposition     ED Disposition Condition Date/Time Comment    Admit Stable Wed Jan 26, 2022  5:51 AM Case was discussed with RUBEN and the patient's admission status was agreed to be Admission Status: inpatient status to the service of Dr Justin Steele           Follow-up Information    None         Discharge Medication List as of 1/27/2022  1:18 PM      CONTINUE these medications which have CHANGED    Details   aspirin 81 mg chewable tablet Chew 1 tablet (81 mg total) daily, Starting Fri 1/28/2022, Until Sun 2/27/2022, Normal      atorvastatin (LIPITOR) 40 mg tablet Take 1 tablet (40 mg total) by mouth every evening, Starting Thu 1/27/2022, Until Sat 2/26/2022, Normal      metoprolol succinate (TOPROL-XL) 25 mg 24 hr tablet Take 1 tablet (25 mg total) by mouth daily, Starting Fri 1/28/2022, Until Sun 2/27/2022, Normal      ticagrelor (Brilinta) 90 MG Take 1 tablet (90 mg total) by mouth every 12 (twelve) hours, Starting Thu 1/27/2022, Until Sat 2/26/2022, Normal                 PDMP Review     None          ED Provider  Electronically Signed by           Nirmala Mclean PA-C  01/31/22 7138

## 2022-01-26 NOTE — Clinical Note
The right coronary artery was selectively engaged, injected and visualized  Multiple views of the injected vessel were taken

## 2022-01-26 NOTE — ASSESSMENT & PLAN NOTE
· Reports midsternal chest pain that radiates to bilateral shoulders  Reports chest pain started about 2 hours prior to arrival   Patient awoken by pain and had an episode of vomiting    · Hx of DM, hyperlipidemia, CAD  · ECG: No ischemic changes  · Troponin: 0hr 279, 2hr 955, 4hr pending  · Given 324mg ASA and SL nitro per EMS  · Plan  · Serial ECG and troponins  · Heparin drip  · Metoprolol 25mg QD  · ASA 81mg daily  · Increase Lipitor to 40mg QD (previously on 20mg QD)  · Monitor on telemetry  · Consult to Cardiology, recommendations are appreciated

## 2022-01-26 NOTE — Clinical Note
Prepped: groin and right radial  Prepped with: ChloraPrep  The site was clipped  The patient was draped

## 2022-01-26 NOTE — H&P
[facility]  H&P- Kristel Augustin 1950, 73 y o  male MRN: [id number]  Unit/Bed#: ED 11 Encounter: [id number]  Primary Care Provider: No primary care provider on file  Date and time admitted to hospital: 1/26/2022  2:49 AM    * NSTEMI (non-ST elevated myocardial infarction) [facility])  Assessment & Plan  · Reports midsternal chest pain that radiates to bilateral shoulders  Reports chest pain started about 2 hours prior to arrival   Patient awoken by pain and had an episode of vomiting  · Hx of DM, hyperlipidemia, CAD  · ECG: No ischemic changes  · Troponin: 0hr 279, 2hr 955, 4hr pending  · Given 324mg ASA and SL nitro per EMS  · Plan  · Serial ECG and troponins  · Heparin drip  · Metoprolol 25mg QD  · ASA 81mg daily  · Increase Lipitor to 40mg QD (previously on 20mg QD)  · Monitor on telemetry  · Consult to Cardiology, recommendations are appreciated    CKD (chronic kidney disease)  Assessment & Plan  Lab Results   Component Value Date    EGFR 53 01/26/2022    CREATININE 1 34 (H) 01/26/2022     · Patients creatinine mildly elevated at 1 34 on admission  · Per chart review baseline appears to be 1 0 (12/1/21)  · Does not meet YEMI criteria at this time  · Avoid nephrotoxic agents and hypotension  · Monitor closely with BMP    KASEY (obstructive sleep apnea)  Assessment & Plan  · Compliant with CPAP    BPH (benign prostatic hyperplasia)  Assessment & Plan  · Continue oxybutynin 5 mg BID    Type 2 diabetes mellitus, without long-term current use of insulin (HCC)  Assessment & Plan    Lab Results   Component Value Date    HGBA1C 6 6 (H) 12/01/2021     · Hold home oral hypoglycemic regimen  · Start on SSI with accu checks  · Hypoglycemia protocol    VTE Prophylaxis: Heparin Drip  / sequential compression device   Code Status: Level 1 code  Discussion with family:  Patient's wife at bedside  All questions answered to the best my ability      Anticipated Length of Stay:  Patient will be admitted on an Inpatient basis with an anticipated length of stay of  More than 2 midnights  Justification for Hospital Stay: NSTEMI    Total Time for Visit, including Counseling / Coordination of Care: 60 minutes  Greater than 50% of this total time spent on direct patient counseling and coordination of care  Chief Complaint:   Chest pain    History of Present Illness:    Michel Burnett is a 73 y o  male who has a past medical history significant for hypertension, coronary artery disease, diabetes, CKD, BPH, obstructive sleep apnea  He presents with chest pain that started this evening  Patient reports that he was awoke with midsternal chest pain that radiated to bilateral shoulders  Patient did experience episode of vomiting  Chest pain started 2 hours prior to arrival to the ED  Patient was given loading dose of aspirin and sublingual nitroglycerin per EMS  On evaluation patient states that his chest pain is better following the nitroglycerin  Reports history of cardiac catheterization in the past, in which no stents were placed at the time  Admission to medical service for further treatment and evaluation of chest pain  Review of Systems:    Review of Systems   Constitutional: Negative for chills and fever  HENT: Negative for congestion, ear pain, rhinorrhea and sore throat  Eyes: Negative for pain and visual disturbance  Respiratory: Negative for cough and shortness of breath  Cardiovascular: Positive for chest pain  Negative for palpitations  Gastrointestinal: Positive for vomiting  Negative for abdominal pain, constipation, diarrhea and nausea  Genitourinary: Negative for dysuria and hematuria  Musculoskeletal: Negative for arthralgias, back pain and myalgias  Skin: Negative for color change and rash  Neurological: Negative for dizziness, seizures, syncope and headaches  All other systems reviewed and are negative        Past Medical and Surgical History:     Past Medical History: Diagnosis Date    Coronary artery disease     Diabetes mellitus ([facility] [address] )     Hypertension        Past Surgical History:   Procedure Laterality Date    FL INJECTION RIGHT KNEE (ARTHROGRAM)  2018       Meds/Allergies:    Prior to Admission medications    Not on File     I have reviewed home medications using allscripts  Allergies: Allergies   Allergen Reactions    Simvastatin Cough       Social History:     Marital Status: /Civil Union   Occupation: NA  Patient Pre-hospital Living Situation: Lives with family  Patient Pre-hospital Level of Mobility: Independent  Patient Pre-hospital Diet Restrictions: Diabetic  Substance Use History:   Social History     Substance and Sexual Activity   Alcohol Use Never     Social History     Tobacco Use   Smoking Status Never Smoker   Smokeless Tobacco Never Used     Social History     Substance and Sexual Activity   Drug Use Never       Family History:    No family history on file  Physical Exam:     Vitals:   Blood Pressure: 131/71 (01/26/22 0500)  Pulse: 74 (01/26/22 0500)  Temperature: 98 8 °F (37 1 °C) (01/26/22 0309)  Temp Source: Oral (01/26/22 0309)  Respirations: 18 (01/26/22 0500)  Height: 6' (182 9 cm) (01/26/22 0309)  Weight - Scale: 122 kg (270 lb) (01/26/22 0309)  SpO2: 97 % (01/26/22 0500)    Physical Exam  Vitals and nursing note reviewed  Constitutional:       General: He is not in acute distress  Appearance: He is well-developed  He is obese  HENT:      Head: Normocephalic and atraumatic  Nose: No congestion or rhinorrhea  Mouth/Throat:      Mouth: Mucous membranes are moist       Pharynx: Oropharynx is clear  Eyes:      General: No scleral icterus  Conjunctiva/sclera: Conjunctivae normal       Pupils: Pupils are equal, round, and reactive to light  Cardiovascular:      Rate and Rhythm: Normal rate and regular rhythm  Pulses: Normal pulses  Heart sounds: No murmur heard        Pulmonary:      Effort: Pulmonary effort is normal  No respiratory distress  Breath sounds: Normal breath sounds  Abdominal:      General: Bowel sounds are normal  There is no distension  Palpations: Abdomen is soft  Tenderness: There is no abdominal tenderness  Musculoskeletal:         General: Normal range of motion  Cervical back: Neck supple  Right lower leg: No edema  Left lower leg: No edema  Skin:     General: Skin is warm and dry  Neurological:      Mental Status: He is alert and oriented to person, place, and time  Additional Data:     Lab Results: I have personally reviewed pertinent reports  Results from last 7 days   Lab Units 01/26/22  0321   WBC Thousand/uL 9 74   HEMOGLOBIN g/dL 14 5   HEMATOCRIT % 42 7   PLATELETS Thousands/uL 142*   NEUTROS PCT % 77*   LYMPHS PCT % 14   MONOS PCT % 6   EOS PCT % 2     Results from last 7 days   Lab Units 01/26/22  0321   SODIUM mmol/L 140   POTASSIUM mmol/L 4 6   CHLORIDE mmol/L 104   CO2 mmol/L 28   BUN mg/dL 22   CREATININE mg/dL 1 34*   ANION GAP mmol/L 8   CALCIUM mg/dL 8 7   ALBUMIN g/dL 3 3*   TOTAL BILIRUBIN mg/dL 0 92   ALK PHOS U/L 119*   ALT U/L 41   AST U/L 22   GLUCOSE RANDOM mg/dL 232*                       Imaging: I have personally reviewed pertinent reports  XR chest 1 view portable    (Results Pending)       EKG, Pathology, and Other Studies Reviewed on Admission:   · EKG: Reviewed    [facility] Yes     ** Please Note: This note has been constructed using a voice recognition system   **

## 2022-01-26 NOTE — PROGRESS NOTES
Pt transported to APU4, no CP, peripheral neuro intact, AOx4  R wrist TR band, 13cc air  Site C/D/I

## 2022-01-26 NOTE — CONSULTS
Consultation - Cardiology   Minerva Raymundo 73 y o  male MRN: [id number]  Unit/Bed#: ED 11 Encounter: [id number]  01/26/22  11:45 AM    Assessment/ Plan:  1  Elevated troponin concerning for ACS  Patient presents with chest pain that woke him up during the night accompanied by one episode of nausea and vomiting, improved with SL nitro  HS troponin 0hr 279, 2hr 955, 4hr pending, delta 676  ECG showed SR with sinus arrhythmia, T wave abnormality, consider inferior ischemia  Continue heparin gtt, metoprolol succinate 25mg daily, atorvastatin 40mg daily, and aspirin 81mg daily  Plan for cardiac catheterization today  Echo ordered to evaluate EF and wall motion  Cardiac catheterization was explained to the patient including the risks of death, myocardial infarction, stroke, vascular injury, bleeding, thrombosis, pseudoaneurysm, allergy, infection and neural injury  Patient understood this and consented to the procedure  We did discuss the potential for catheter based intervention including a greater than 90% chance of acute success, 25% chance of restenosis and a 1% or less chance of acute closure which may be associated with myocardial infarction  and/or need for urgent surgery at increased risk  2  Chest pain  See plan above    3  CAD  Patient notes that he underwent cardiac catheterization around 1997, no stents were placed at that time, patient stated that he had pain in his left arm at the time of this cardiac cath  Continue atorvastatin 40mg, metoprolol succinate 25mg daily, and aspirin 81mg    4  Hypertension  Continue metoprolol succinate 25 mg daily    5  Hyperlipidemia  Continue atorvastatin 40 mg daily    6   Diabetes type 2  Hemoglobin A1c 6 6      History of Present Illness   Physician Requesting Consult: No att  providers found    Reason for Consult / Principal Problem:  NSTEMI    HPI: Minerva Raymundo is a 73y o  year old male with PMH of hypertension, CAD, diabetes type 2, CKD, BPH, KASEY who presents with chest pain that will come a overnight  Patient states that around midnight he woke up with midsternal chest pain in the shape of a T  Describes the pain as burning about a 5/10 in intensity  He denies any radiation of pain to his jaw or arms  Pain did not improve after a few minutes so he called EMS  He states he had 1 episode of nausea and vomiting  EMS gave him aspirin and SL nitro  Patient states that his chest pain improved from 5/10 to 3/10  Patient has a history of CAD and underwent cardiac catheterization around   Patient states that he did not have a stent placed at that time  Patient denies smoking or alcohol use  Patient stays active and states that he uses a recumbent bike on a daily basis  He bikes approximately 12 miles per day without exertional symptoms  Patient states he has a family history of CAD and states that his mother, father, and grandmother  in their 46s and 62s from heart attacks  Patient appears comfortable at this time  He denies any chest pain, palpitations, shortness of breath, or lower extremity edema  Consults    EKG:  Normal sinus rhythm with sinus arrhythmia  T-wave abnormality, consider inferior ischemia  Abnormal ECG      Review of Systems   Constitutional: Negative for chills, diaphoresis and fever  Respiratory: Negative for cough, chest tightness and shortness of breath  Cardiovascular: Positive for chest pain  Negative for palpitations and leg swelling  Gastrointestinal: Positive for nausea and vomiting  Negative for abdominal distention and blood in stool  Genitourinary: Negative for difficulty urinating  Musculoskeletal: Negative for arthralgias and back pain  Neurological: Negative for dizziness, syncope, light-headedness and headaches  Psychiatric/Behavioral: Negative for agitation and confusion  The patient is not nervous/anxious          Historical Information   Past Medical History:   Diagnosis Date    Coronary artery disease     Diabetes mellitus ([facility] [address] )     Hypertension      Past Surgical History:   Procedure Laterality Date    FL INJECTION RIGHT KNEE (ARTHROGRAM)  2018     Social History     Substance and Sexual Activity   Alcohol Use Never     Social History     Substance and Sexual Activity   Drug Use Never     Social History     Tobacco Use   Smoking Status Never Smoker   Smokeless Tobacco Never Used       Family History: No family history on file  Meds/Allergies   all current active meds have been reviewed and current meds:   Current Facility-Administered Medications   Medication Dose Route Frequency    acetaminophen (TYLENOL) tablet 650 mg  650 mg Oral Q6H PRN    [START ON 1/27/2022] aspirin chewable tablet 81 mg  81 mg Oral Daily    atorvastatin (LIPITOR) tablet 40 mg  40 mg Oral QPM    heparin (porcine) 25,000 units in D5W 250 mL infusion (premix)  3-20 Units/kg/hr (Order-Specific) Intravenous Titrated    heparin (porcine) injection 2,000 Units  2,000 Units Intravenous Q1H PRN    heparin (porcine) injection 4,000 Units  4,000 Units Intravenous Q1H PRN    insulin lispro (HumaLOG) 100 units/mL subcutaneous injection 1-5 Units  1-5 Units Subcutaneous HS    insulin lispro (HumaLOG) 100 units/mL subcutaneous injection 2-12 Units  2-12 Units Subcutaneous TID AC    metoprolol succinate (TOPROL-XL) 24 hr tablet 25 mg  25 mg Oral Daily    nitroglycerin (NITROSTAT) SL tablet 0 4 mg  0 4 mg Sublingual Q5 Min PRN    ondansetron (ZOFRAN) injection 4 mg  4 mg Intravenous Q6H PRN    oxybutynin (DITROPAN) tablet 5 mg  5 mg Oral BID    sodium chloride 0 9 % infusion  75 mL/hr Intravenous Continuous    timolol (TIMOPTIC) 0 5 % ophthalmic solution 1 drop  1 drop Right Eye TID     Allergies   Allergen Reactions    Simvastatin Cough       Objective   Vitals: Blood pressure 144/72, pulse 79, temperature 98 8 °F (37 1 °C), temperature source Oral, resp  rate (!) 30, height 6' (1 829 m), weight 122 kg (270 lb), SpO2 98 %  , Body mass index is 36 62 kg/m² ,   Orthostatic Blood Pressures      Most Recent Value   Blood Pressure 144/72 filed at 2022 0917   Patient Position - Orthostatic VS Lying filed at 2022 9924          Systolic (94JTL), ISX:583 , Min:121 , KTR:267     Diastolic (46IAP), WT, Min:60, Max:72      No intake or output data in the 24 hours ending 22 1145    Invasive Devices  Report    Peripheral Intravenous Line            Peripheral IV 22 Left Antecubital <1 day    Peripheral IV 22 Right Antecubital <1 day                    Physical Exam:  Physical Exam  Vitals and nursing note reviewed  Constitutional:       Appearance: He is well-developed  He is obese  HENT:      Head: Normocephalic and atraumatic  Eyes:      Conjunctiva/sclera: Conjunctivae normal    Cardiovascular:      Rate and Rhythm: Normal rate and regular rhythm  Heart sounds: Normal heart sounds  No murmur heard  Pulmonary:      Effort: Pulmonary effort is normal  No respiratory distress  Breath sounds: Normal breath sounds  Abdominal:      Palpations: Abdomen is soft  Tenderness: There is no abdominal tenderness  Musculoskeletal:      Cervical back: Neck supple  Right lower leg: No edema  Left lower leg: No edema  Skin:     General: Skin is warm and dry  Neurological:      Mental Status: He is alert            Lab Results:     Cardiac Profile:   Results from last 7 days   Lab Units 22  0522 22  0321   HS TNI 0HR ng/L  --  279*   HS TNI 2HR ng/L 955*  --    HSTNI D2 ng/L 676*  --    NT-PRO BNP pg/mL  --  54       CBC with diff:   Results from last 7 days   Lab Units 22  0602 22  0321   WBC Thousand/uL 9 63 9 74   HEMOGLOBIN g/dL 14 5 14 5   HEMATOCRIT % 42 4 42 7   MCV fL 94 93   PLATELETS Thousands/uL 139* 142*   MCH pg 32 2 31 7   MCHC g/dL 34 2 34 0   RDW % 12 5 12 5   MPV fL 10 3 10 3   NRBC AUTO /100 WBCs  --  0         CMP:   Results from last 7 days   Lab Units 01/26/22  0321   POTASSIUM mmol/L 4 6   CHLORIDE mmol/L 104   CO2 mmol/L 28   BUN mg/dL 22   CREATININE mg/dL 1 34*   CALCIUM mg/dL 8 7   AST U/L 22   ALT U/L 41   ALK PHOS U/L 119*   EGFR ml/min/1 73sq m 53

## 2022-01-26 NOTE — Clinical Note
Defib pad site: anterior/posterior and left lower flank  Defib pad site: Right Anterior Chest Defib pad site assessment: skin integrity intact

## 2022-01-26 NOTE — ASSESSMENT & PLAN NOTE
17-year-old male with underlying history of CAD/type 2 diabetes mellitus/hyperlipidemia/KASEY/BPH who presented to the emergency department with symptoms of burning substernal chest pain which began close to midnight located a crossed his shoulders  It was associated with an episode of nausea vomiting  No shortness of breaths/diaphoresis      · Troponin trend 279/955  · EKG does not note any ischemic changes  · Initially on heparin drip on admission  · Seen by Cardiology  · Patient status post cardiac catheterization yesterday and found to have 99% mid RCA stenosis status post PCI/VIDA  · Continue with aspirin/Brilinta/Lipitor/metoprolol  · Ambulatory referral placed to cardiac rehab

## 2022-01-27 ENCOUNTER — TELEPHONE (OUTPATIENT)
Dept: CARDIOLOGY CLINIC | Facility: CLINIC | Age: 72
End: 2022-01-27

## 2022-01-27 VITALS
OXYGEN SATURATION: 95 % | HEART RATE: 67 BPM | RESPIRATION RATE: 18 BRPM | SYSTOLIC BLOOD PRESSURE: 154 MMHG | WEIGHT: 270 LBS | BODY MASS INDEX: 36.57 KG/M2 | DIASTOLIC BLOOD PRESSURE: 78 MMHG | TEMPERATURE: 97.9 F | HEIGHT: 72 IN

## 2022-01-27 LAB
ALBUMIN SERPL BCP-MCNC: 3.3 G/DL (ref 3.5–5)
ALP SERPL-CCNC: 121 U/L (ref 46–116)
ALT SERPL W P-5'-P-CCNC: 44 U/L (ref 12–78)
ANION GAP SERPL CALCULATED.3IONS-SCNC: 6 MMOL/L (ref 4–13)
APTT PPP: 27 SECONDS (ref 23–37)
AST SERPL W P-5'-P-CCNC: 51 U/L (ref 5–45)
BILIRUB SERPL-MCNC: 1.58 MG/DL (ref 0.2–1)
BUN SERPL-MCNC: 13 MG/DL (ref 5–25)
CALCIUM ALBUM COR SERPL-MCNC: 9.3 MG/DL (ref 8.3–10.1)
CALCIUM SERPL-MCNC: 8.7 MG/DL (ref 8.3–10.1)
CHLORIDE SERPL-SCNC: 105 MMOL/L (ref 100–108)
CO2 SERPL-SCNC: 28 MMOL/L (ref 21–32)
CREAT SERPL-MCNC: 1.11 MG/DL (ref 0.6–1.3)
ERYTHROCYTE [DISTWIDTH] IN BLOOD BY AUTOMATED COUNT: 12.5 % (ref 11.6–15.1)
GFR SERPL CREATININE-BSD FRML MDRD: 67 ML/MIN/1.73SQ M
GLUCOSE SERPL-MCNC: 132 MG/DL (ref 65–140)
GLUCOSE SERPL-MCNC: 136 MG/DL (ref 65–140)
GLUCOSE SERPL-MCNC: 138 MG/DL (ref 65–140)
HCT VFR BLD AUTO: 43.3 % (ref 36.5–49.3)
HGB BLD-MCNC: 15 G/DL (ref 12–17)
MAGNESIUM SERPL-MCNC: 1.9 MG/DL (ref 1.6–2.6)
MCH RBC QN AUTO: 32.7 PG (ref 26.8–34.3)
MCHC RBC AUTO-ENTMCNC: 34.6 G/DL (ref 31.4–37.4)
MCV RBC AUTO: 94 FL (ref 82–98)
PLATELET # BLD AUTO: 138 THOUSANDS/UL (ref 149–390)
PMV BLD AUTO: 10.4 FL (ref 8.9–12.7)
POTASSIUM SERPL-SCNC: 4 MMOL/L (ref 3.5–5.3)
PROT SERPL-MCNC: 6.4 G/DL (ref 6.4–8.2)
RBC # BLD AUTO: 4.59 MILLION/UL (ref 3.88–5.62)
SODIUM SERPL-SCNC: 139 MMOL/L (ref 136–145)
WBC # BLD AUTO: 8.39 THOUSAND/UL (ref 4.31–10.16)

## 2022-01-27 PROCEDURE — 82948 REAGENT STRIP/BLOOD GLUCOSE: CPT

## 2022-01-27 PROCEDURE — 99232 SBSQ HOSP IP/OBS MODERATE 35: CPT | Performed by: INTERNAL MEDICINE

## 2022-01-27 PROCEDURE — 80053 COMPREHEN METABOLIC PANEL: CPT | Performed by: INTERNAL MEDICINE

## 2022-01-27 PROCEDURE — 83735 ASSAY OF MAGNESIUM: CPT | Performed by: INTERNAL MEDICINE

## 2022-01-27 PROCEDURE — 99239 HOSP IP/OBS DSCHRG MGMT >30: CPT | Performed by: FAMILY MEDICINE

## 2022-01-27 PROCEDURE — 85027 COMPLETE CBC AUTOMATED: CPT | Performed by: INTERNAL MEDICINE

## 2022-01-27 PROCEDURE — 85730 THROMBOPLASTIN TIME PARTIAL: CPT | Performed by: FAMILY MEDICINE

## 2022-01-27 RX ORDER — ASPIRIN 81 MG/1
81 TABLET, CHEWABLE ORAL DAILY
Qty: 30 TABLET | Refills: 0 | Status: SHIPPED | OUTPATIENT
Start: 2022-01-28 | End: 2022-02-27

## 2022-01-27 RX ORDER — ATORVASTATIN CALCIUM 40 MG/1
40 TABLET, FILM COATED ORAL EVERY EVENING
Qty: 30 TABLET | Refills: 0 | Status: SHIPPED | OUTPATIENT
Start: 2022-01-27 | End: 2022-01-27

## 2022-01-27 RX ORDER — ASPIRIN 81 MG/1
81 TABLET, CHEWABLE ORAL DAILY
Qty: 30 TABLET | Refills: 0 | Status: SHIPPED | OUTPATIENT
Start: 2022-01-28 | End: 2022-01-27

## 2022-01-27 RX ORDER — METOPROLOL SUCCINATE 25 MG/1
25 TABLET, EXTENDED RELEASE ORAL DAILY
Qty: 30 TABLET | Refills: 0 | Status: SHIPPED | OUTPATIENT
Start: 2022-01-28 | End: 2022-01-27

## 2022-01-27 RX ORDER — ATORVASTATIN CALCIUM 40 MG/1
40 TABLET, FILM COATED ORAL EVERY EVENING
Qty: 30 TABLET | Refills: 0 | Status: SHIPPED | OUTPATIENT
Start: 2022-01-27 | End: 2022-02-03 | Stop reason: SDUPTHER

## 2022-01-27 RX ORDER — METOPROLOL SUCCINATE 25 MG/1
25 TABLET, EXTENDED RELEASE ORAL DAILY
Qty: 30 TABLET | Refills: 0 | Status: SHIPPED | OUTPATIENT
Start: 2022-01-28 | End: 2022-02-03 | Stop reason: SDUPTHER

## 2022-01-27 RX ADMIN — ASPIRIN 81 MG: 81 TABLET, CHEWABLE ORAL at 09:58

## 2022-01-27 RX ADMIN — METOPROLOL SUCCINATE 25 MG: 25 TABLET, FILM COATED, EXTENDED RELEASE ORAL at 09:58

## 2022-01-27 RX ADMIN — TIMOLOL MALEATE 1 DROP: 5 SOLUTION/ DROPS OPHTHALMIC at 09:59

## 2022-01-27 RX ADMIN — TICAGRELOR 90 MG: 90 TABLET ORAL at 09:58

## 2022-01-27 RX ADMIN — OXYBUTYNIN CHLORIDE 5 MG: 5 TABLET ORAL at 09:58

## 2022-01-27 NOTE — UTILIZATION REVIEW
Initial Clinical Review    Admission: Date/Time/Statement:   Admission Orders (From admission, onward)     Ordered        01/26/22 0540  Inpatient Admission  Once                      Orders Placed This Encounter   Procedures    Inpatient Admission     Standing Status:   Standing     Number of Occurrences:   1     Order Specific Question:   Level of Care     Answer:   Med Surg [16]     Order Specific Question:   Estimated length of stay     Answer:   More than 2 Midnights     Order Specific Question:   Certification     Answer:   I certify that inpatient services are medically necessary for this patient for a duration of greater than two midnights  See H&P and MD Progress Notes for additional information about the patient's course of treatment  ED Arrival Information     Expected Arrival Acuity    - 1/26/2022 02:49 Emergent         Means of arrival Escorted by Service Admission type    Ambulance [facility] Emergency         Arrival complaint    chest pain        Chief Complaint   Patient presents with    Chest Pain     Pt present to ED via ems from home c/o midsternal chest pain radiating b/l shoulders onset 2 hrs PTA w/one emesis episode, pt describe the pain as "burning sensation" woke him from his sleep, hxof CAD 20 years ago, hx of DM 2, currently denies sob, palpitations, n/v, one nitro and 324mg asp  Initial Presentation: 72 yo male to ED from home via EMS w/ CP that started this evening   midsternal radiates to broderick shoulders  Episode if vomiting   In ED given loading dose asa, sl ntg by EMS   CP better following NTG   Troponin: 0hr 279, 2hr 955, EKG w/ no ischemic changes  Admitted IP status w/ NSTEMI plan for heparin gtt , lopressor , asa, lipitor , tele , cardiology consult   CKD Cr 1 34, baseline 1 0 monitor BMP   DM SSI and monitor   1/27 Cardiology consult   Elevated trop concerning for ACS , trop 279/955/676   ECG showed SR with sinus arrhythmia, T wave abnormality, consider inferior ischemia  Cont heprain gtt ,plan for cardiac cath   F/u echo   Cont lopressor , statin        ED Triage Vitals   Temperature Pulse Respirations Blood Pressure SpO2   01/26/22 0309 01/26/22 0309 01/26/22 0309 01/26/22 0309 01/26/22 0309   98 8 °F (37 1 °C) 83 16 132/64 96 %      Temp Source Heart Rate Source Patient Position - Orthostatic VS BP Location FiO2 (%)   01/26/22 0309 01/26/22 0309 01/26/22 0309 01/26/22 0309 01/26/22 2213   Oral Monitor Lying Right arm 21      Pain Score       01/26/22 0309       3          Wt Readings from Last 1 Encounters:   01/26/22 122 kg (270 lb)     Additional Vital Signs:   01/27/22 02:59:59 98 °F (36 7 °C) 67 18 163/78 106 94 % -- -- -- -- -- -- --   01/27/22 00:00:03 98 °F (36 7 °C) 67 -- 163/81 108 97 % -- -- -- -- -- -- --   01/26/22 2213 -- -- -- -- -- 98 % 21 -- -- CPAP Face mask -- --   01/26/22 2000 -- -- -- -- -- 98 % -- -- -- None (Room air) -- -- --   01/26/22 19:49:46 -- 64 -- 179/94 Abnormal  122 98 % -- -- -- -- -- -- --   01/26/22 19:47:44 -- 65 -- 185/98 Abnormal  127 98 % -- -- -- -- -- -- --   01/26/22 18:50:42 98 3 °F (36 8 °C) 61 18 174/94 Abnormal  121 98 % -- -- -- -- -- -- --   01/26/22 18:16:48 -- 65 -- 173/96 Abnormal  122 97 % -- -- -- None (Room air) -- -- --   01/26/22 1815 97 8 °F (36 6 °C) -- -- -- -- -- -- -- -- -- -- -- --   01/26/22 1730 -- 63 12 172/83 Abnormal  -- 98 % -- -- -- None (Room air) -- X --   01/26/22 1700 -- 64 16 153/91 -- 98 % -- -- -- None (Room air) -- -- --   01/26/22 1630 -- 65 18 142/77 -- 98 % -- -- -- None (Room air) -- X --   01/26/22 1600 -- 75 13 162/81 -- 98 % -- -- -- None (Room air) -- -- --   01/26/22 1530 -- 56 13 162/77 -- 98 % -- -- -- None (Room air) -- X --   01/26/22 1515 -- 69 13 138/89 -- 99 % -- -- -- None (Room air) -- X --   01/26/22 1500 -- 75 13 150/82 -- 98 % -- -- -- None (Room air) -- X --   01/26/22 1430 -- 64 12 134/73 -- 96 % -- -- -- None (Room air) -- X --   01/26/22 1415 -- 61 20 137/78 -- 95 % -- -- -- None (Room air) -- X --   01/26/22 1405 97 5 °F (36 4 °C) 58 18 139/66 -- 98 % -- -- -- None (Room air) -- X --   01/26/22 12:28:07 -- -- -- -- -- 100 % -- 32 3 L/min Nasal cannula -- -- --   01/26/22 12:20:25 -- -- -- -- -- 100 % -- 32 3 L/min Nasal cannula -- -- --   01/26/22 0917 -- 79 30 Abnormal  144/72 102 98 % -- -- -- None (Room air) -- -- --   01/26/22 0914 -- 71 -- 144/72 -- -- -- -- -- -- -- -- --   01/26/22 0622 -- 66 14 121/60 -- 96 % -- -- -- None (Room air) -- -- Lying   01/26/22 0500 -- 74 18 131/71 93 97 % -- -- -- -- -- -- --   01/26/22 0447 -- 72 20 135/72 -- 97 % -- -- -- None (Room air) -- -- Lying   01/26/22 0312 -- -- -- -- -- 96 % -- --            Pertinent Labs/Diagnostic Test Results:   1/26 EKG Normal sinus rhythm with sinus arrhythmia  T wave abnormality, consider inferior ischemia  Abnormal ECG  1/26 CXR No acute consolidation or congestion  No pleural effusion or pneumothorax  1/26 Echo   Left Ventricle: Left ventricular cavity size is normal  The left ventricular ejection fraction is 60%  Systolic function is normal  Wall motion is otherwise normal  Diastolic function is normal  Wall thickness is increased  There is mild to moderate concentric hypertrophy    Right Ventricle: Right ventricular cavity size is mildly dilated    Mitral Valve: There is mild annular calcification    1/26 Cardiac cath   Left Main   The vessel was visualized by angiography, is large and is angiographically normal    Left Anterior Descending   The vessel was visualized by angiography and is moderate in size  The vessel exhibits minimal luminal irregularities  Mid LAD lesion is 60% stenosed  The lesion is moderately calcified  iFR was measured in the Mid LAD  iFR ratio: 0 98  The iFR was nonsignificant, not intervened  First Diagonal Branch   The vessel is moderate in size and is angiographically normal    1st Diag lesion is 90% stenosed     Left Circumflex   The vessel was visualized by angiography, is moderate in size and is angiographically normal    Right Coronary Artery   Prox RCA lesion is 45% stenosed  The lesion is eccentric  The lesion is moderately calcified  Mid RCA lesion is 99% stenosed  Culprit for recent MI  Lesion length: 22      Mid RCA lesion   Angioplasty   Angioplasty using a compliant stent balloon was performed independent of stent deployment  The balloon used was a CATH BAL PTCA RX TREK 3 X 15MM  Maximum pressure: 8 geena  Inflation time: 20 sec  Time obtained: 13:30 EST  Supplies used: CATH BAL PTCA RX TREK 3 X 15MM; GUIDEWIRE RUNTHROUGH 180CM; CATH GUIDE LAUNCHER 6FR JR 4 0   Angioplasty   Angioplasty was performed independent of stent deployment  Maximum pressure: 8 geena  Inflation time: 6 sec  Time obtained: 13:42 EST  Supplies used: CATH BAL PTCA RX TREK 3 X 15MM   Stent   Drug-eluting stent was successfully placed  The stent used was a STENT XIENCE SKYPOINT 4 X 28MM  Stent was deployed by way of balloon expansion  Maximum pressure: 14 geena  Inflation time: 17 sec  Supplies used: STENT XIENCE SKYPOINT 4 X 28MM   Post-Intervention Lesion Assessment   The intervention was successful  The guidewire crossed the lesion  Post-intervention TERRENCE flow is 3  Lesion had 24 mm of its length treated  There were no complications  There is a 0% residual stenosis post intervention         Results from last 7 days   Lab Units 01/26/22  0321   SARS-COV-2  Negative     Results from last 7 days   Lab Units 01/27/22  0551 01/26/22  0602 01/26/22  0321   WBC Thousand/uL 8 39 9 63 9 74   HEMOGLOBIN g/dL 15 0 14 5 14 5   HEMATOCRIT % 43 3 42 4 42 7   PLATELETS Thousands/uL 138* 139* 142*   NEUTROS ABS Thousands/µL  --   --  7 52     Results from last 7 days   Lab Units 01/27/22  0551 01/26/22  0321   SODIUM mmol/L 139 140   POTASSIUM mmol/L 4 0 4 6   CHLORIDE mmol/L 105 104   CO2 mmol/L 28 28   ANION GAP mmol/L 6 8   BUN mg/dL 13 22   CREATININE mg/dL 1 11 1 34*   EGFR ml/min/1 73sq m 67 53   CALCIUM mg/dL 8 7 8 7   MAGNESIUM mg/dL 1 9  --      Results from last 7 days   Lab Units 01/27/22  0551 01/26/22  0321   AST U/L 51* 22   ALT U/L 44 41   ALK PHOS U/L 121* 119*   TOTAL PROTEIN g/dL 6 4 6 5   ALBUMIN g/dL 3 3* 3 3*   TOTAL BILIRUBIN mg/dL 1 58* 0 92     Results from last 7 days   Lab Units 01/26/22  2032 01/26/22  1818 01/26/22  1134 01/26/22  0824   POC GLUCOSE mg/dl 156* 140 110 127     Results from last 7 days   Lab Units 01/27/22  0551 01/26/22  0321   GLUCOSE RANDOM mg/dL 136 232*       Results from last 7 days   Lab Units 01/26/22  0522 01/26/22  0321   HS TNI 0HR ng/L  --  279*   HS TNI 2HR ng/L 955*  --    HSTNI D2 ng/L 676*  --      Results from last 7 days   Lab Units 01/27/22  0559 01/26/22  0602   PROTIME seconds  --  12 9   INR   --  1 01   PTT seconds 27 26     Results from last 7 days   Lab Units 01/26/22  0321   NT-PRO BNP pg/mL 54     Results from last 7 days   Lab Units 01/26/22  0321   INFLUENZA A PCR  Negative   INFLUENZA B PCR  Negative   RSV PCR  Negative     ED Treatment:   Medication Administration from 01/26/2022 0248 to 01/26/2022 1216       Date/Time Order Dose Route Action     01/26/2022 0914 oxybutynin (DITROPAN) tablet 5 mg 5 mg Oral Given     01/26/2022 0914 metoprolol succinate (TOPROL-XL) 24 hr tablet 25 mg 25 mg Oral Given     01/26/2022 7435 heparin (porcine) injection 4,000 Units 4,000 Units Intravenous Given     01/26/2022 0636 heparin (porcine) 25,000 units in D5W 250 mL infusion (premix) 11 1 Units/kg/hr Intravenous New Bag     01/26/2022 0954 sodium chloride 0 9 % infusion 75 mL/hr Intravenous New Bag        Past Medical History:   Diagnosis Date    Coronary artery disease     Diabetes mellitus ([facility] )     Hypertension      Present on Admission:  **None**      Admitting Diagnosis: Chest pain [R07 9]  [facility]STEM[facility] (non-ST elevated [facility] [I21 4]  Coronary artery disease involving native coronary artery of native heart with angina pectoris ([facility] ) [I25 119]  Age/Sex: 73 y o  male  Admission Orders:  Scheduled Medications:  aspirin, 81 mg, Oral, Daily  atorvastatin, 40 mg, Oral, QPM  insulin lispro, 1-5 Units, Subcutaneous, HS  insulin lispro, 2-12 Units, Subcutaneous, TID AC  metoprolol succinate, 25 mg, Oral, Daily  oxybutynin, 5 mg, Oral, BID  ticagrelor, 90 mg, Oral, Q12H TAD  timolol, 1 drop, Right Eye, TID      Continuous IV Infusions:     PRN Meds:  acetaminophen, 650 mg, Oral, Q6H PRN  hydrALAZINE, 5 mg, Intravenous, Q6H PRN  nitroglycerin, 0 4 mg, Sublingual, Q5 Min PRN  ondansetron, 4 mg, Intravenous, Q6H PRN    Tele   I&O   Fingerstick ac and hs       IP CONSULT TO CASE MANAGEMENT  IP CONSULT TO CARDIOLOGY  IP CONSULT TO CASE MANAGEMENT    Network Utilization Review Department  ATTENTION: Please call with any questions or concerns to [phone] and carefully listen to the prompts so that you are directed to the right person  All voicemails are confidential   Cory Flores all requests for admission clinical reviews, approved or denied determinations and any other requests to dedicated fax number below belonging to the campus where the patient is receiving treatment   List of dedicated fax numbers for the Facilities:  [address] DENIALS (Administrative/Medical Necessity) [phone]   [address] (Maternity/NICU/Pediatrics) [phone]   [address] [place] [address] [address] [place] [address] [facility] [place] [address] [address] [address] [place] [address] [phone]

## 2022-01-27 NOTE — ASSESSMENT & PLAN NOTE
Lab Results   Component Value Date    EGFR 67 01/27/2022    EGFR 53 01/26/2022    CREATININE 1 11 01/27/2022    CREATININE 1 34 (H) 01/26/2022     · Patients creatinine mildly elevated at 1 34 on admission  · 1 11 now at [facility]

## 2022-01-27 NOTE — DISCHARGE SUMMARY
[facility]  Discharge- Chloe Sham 1950, 73 y o  male MRN: [id number]  Unit/Bed#: -Claudia Encounter: [id number]  Primary Care Provider: No primary care provider on file  Date and time admitted to hospital: 1/26/2022  2:49 AM    * NSTEMI (non-ST elevated myocardial infarction) [facility])  Assessment & Plan  68-year-old male with underlying history of CAD/type 2 diabetes mellitus/hyperlipidemia/KASEY/BPH who presented to the emergency department with symptoms of burning substernal chest pain which began close to midnight located a crossed his shoulders  It was associated with an episode of nausea vomiting  No shortness of breaths/diaphoresis  · Troponin trend 279/955  · EKG does not note any ischemic changes  · Initially on heparin drip on admission  · Seen by Cardiology  · Patient status post cardiac catheterization yesterday and found to have 99% mid RCA stenosis status post PCI/VIDA  · Continue with aspirin/Brilinta/Lipitor/metoprolol  · Ambulatory referral placed to cardiac rehab        Type 2 diabetes mellitus, without long-term current use of insulin ([facility])  Assessment & Plan    Lab Results   Component Value Date    HGBA1C 6 6 (H) 12/01/2021       KASEY (obstructive sleep apnea)  Assessment & Plan  · Compliant with CPAP    CKD (chronic kidney disease)  Assessment & Plan  Lab Results   Component Value Date    EGFR 67 01/27/2022    EGFR 53 01/26/2022    CREATININE 1 11 01/27/2022    CREATININE 1 34 (H) 01/26/2022     · Patients creatinine mildly elevated at 1 34 on admission  · 1 11 now at [facility]      BPH (benign prostatic hyperplasia)  Assessment & Plan  · Continue oxybutynin 5 mg BID      Discharging Physician / Practitioner: Padma Rodriguez MD  PCP: No primary care provider on file    Admission Date:   Admission Orders (From admission, onward)     Ordered        01/26/22 0540  Inpatient Admission  Once                      Discharge Date: 01/27/22    Disposition:      · home    Reason for Admission: chest pain    Discharge Diagnoses:     Please see assessment and plan section above for further details regarding discharge diagnoses  Medical Problems             Resolved Problems  Date Reviewed: 1/26/2022    None                Consultations During Hospital Stay:  · Cardiology    Procedures Performed:   · Cardiac cath, RCA stent placed     Medication Adjustments and Discharge Medications:  · Summary of Medication Adjustments made as a result of this hospitalization: see med rec  · Medication Dosing Tapers - Please refer to Discharge Medication List for details on any medication dosing tapers (if applicable to patient)  · Medications being temporarily held (include recommended restart time): see med rec  · Discharge Medication List: See after visit summary for reconciled discharge medications  Diet Recommendations at Discharge:  Diet -        Diet Orders   (From admission, onward)             Start     Ordered    01/26/22 1410  Diet Cardiovascular; Cardiac; Consistent Carbohydrate Diet Level 3 (6 carb servings/90 grams CHO/meal)  Diet effective now        References:    Nutrtion Support Algorithm Enteral vs  Parenteral   Question Answer Comment   Diet Type Cardiovascular    Cardiac Cardiac    Other Restriction(s): Consistent Carbohydrate Diet Level 3 (6 carb servings/90 grams CHO/meal)    RD to adjust diet per protocol? Yes        01/26/22 1409              Hospital Course:     Gema Romero is a 73 y o  male patient who originally presented to the hospital on 1/26/2022 due to complains of burning substernal chest pain  Patient with underlying history of CAD/type 2 diabetes mellitus/hyperlipidemia/KASEY/BPH  Found to have non ST elevation MI type 1, elevated troponin with no ischemic changes on EKG Patient underwent cardiac catheterization yesterday and found to have 99% mid RCA stenosis status post PCI/VIDA placement  Continue with aspirin and Brilinta Lipitor metoprolol    Patient being discharged home today with outpatient follow up and ambulatory referral placed to cardiac rehab  Above plan of care discussed with patient who is in agreement  Patient being discharged home  Condition at Discharge: good     Discharge Day Visit / Exam:     Vitals: Blood Pressure: 154/78 (01/27/22 0815)  Pulse: 67 (01/27/22 0815)  Temperature: 97 9 °F (36 6 °C) (01/27/22 0815)  Temp Source: Oral (01/26/22 1815)  Respirations: 18 (01/27/22 0815)  Height: 6' (182 9 cm) (01/26/22 0917)  Weight - Scale: 122 kg (270 lb) (01/26/22 0917)  SpO2: 95 % (01/27/22 0815)  Exam:   Physical Exam  Constitutional:       General: He is not in acute distress  Appearance: He is obese  He is not toxic-appearing  HENT:      Mouth/Throat:      Mouth: Mucous membranes are moist       Pharynx: Oropharynx is clear  Cardiovascular:      Rate and Rhythm: Normal rate and regular rhythm  Pulses: Normal pulses  Pulmonary:      Effort: Pulmonary effort is normal  No respiratory distress  Breath sounds: Normal breath sounds  Abdominal:      General: Abdomen is flat  Bowel sounds are normal       Palpations: Abdomen is soft  Musculoskeletal:      Right lower leg: No edema  Left lower leg: No edema  Neurological:      General: No focal deficit present  Mental Status: He is alert and oriented to person, place, and time  Goals of Care Discussions:  · Code Status at Discharge: Level 1 - Full Code    Discharge instructions/Information to patient and family:   See after visit summary section titled Discharge Instructions for information provided to patient and family  Discharge Statement:  I spent 40 minutes discharging the patient  This time was spent on the day of discharge  I had direct contact with the patient on the day of discharge   Greater than 50% of the total time was spent examining patient, answering all patient questions, arranging and discussing plan of care with patient as well as directly providing post-discharge instructions  Additional time then spent on discharge activities      ** Please Note: This note has been constructed using a voice recognition system **

## 2022-01-27 NOTE — PLAN OF CARE
Problem: PAIN - ADULT  Goal: Verbalizes/displays adequate comfort level or baseline comfort level  Description: Interventions:  - Encourage patient to monitor pain and request assistance  - Assess pain using appropriate pain scale  - Administer analgesics based on type and severity of pain and evaluate response  - Implement non-pharmacological measures as appropriate and evaluate response  - Consider cultural and social influences on pain and pain management  - Notify physician/advanced practitioner if interventions unsuccessful or patient reports new pain  Outcome: Progressing     Problem: INFECTION - ADULT  Goal: Absence or prevention of progression during hospitalization  Description: INTERVENTIONS:  - Assess and monitor for signs and symptoms of infection  - Monitor lab/diagnostic results  - Monitor all insertion sites, i e  indwelling lines, tubes, and drains  - Monitor endotracheal if appropriate and nasal secretions for changes in amount and color  - Indian appropriate cooling/warming therapies per order  - Administer medications as ordered  - Instruct and encourage patient and family to use good hand hygiene technique  - Identify and instruct in appropriate isolation precautions for identified infection/condition  Outcome: Progressing  Goal: Absence of fever/infection during neutropenic period  Description: INTERVENTIONS:  - Monitor WBC    Outcome: Progressing     Problem: SAFETY ADULT  Goal: Patient will remain free of falls  Description: INTERVENTIONS:  - Educate patient/family on patient safety including physical limitations  - Instruct patient to call for assistance with activity   - Consult OT/PT to assist with strengthening/mobility   - Keep Call bell within reach  - Keep bed low and locked with side rails adjusted as appropriate  - Keep care items and personal belongings within reach  - Initiate and maintain comfort rounds  - Make Fall Risk Sign visible to staff  - Offer Toileting every Hours, in advance of need  - Initiate/Maintain alarm  - Obtain necessary fall risk management equipment:   - Apply yellow socks and bracelet for high fall risk patients  - Consider moving patient to room near nurses station  Outcome: Progressing  Goal: Maintain or return to baseline ADL function  Description: INTERVENTIONS:  -  Assess patient's ability to carry out ADLs; assess patient's baseline for ADL function and identify physical deficits which impact ability to perform ADLs (bathing, care of mouth/teeth, toileting, grooming, dressing, etc )  - Assess/evaluate cause of self-care deficits   - Assess range of motion  - Assess patient's mobility; develop plan if impaired  - Assess patient's need for assistive devices and provide as appropriate  - Encourage maximum independence but intervene and supervise when necessary  - Involve family in performance of ADLs  - Assess for home care needs following discharge   - Consider OT consult to assist with ADL evaluation and planning for discharge  - Provide patient education as appropriate  Outcome: Progressing  Goal: Maintains/Returns to pre admission functional level  Description: INTERVENTIONS:  - Perform BMAT or MOVE assessment daily    - Set and communicate daily mobility goal to care team and patient/family/caregiver  - Collaborate with rehabilitation services on mobility goals if consulted  - Perform Range of Motion times a day  - Reposition patient every  hours    - Dangle patient  times a day  - Stand patient times a day  - Ambulate patient  times a day  - Out of bed to chair  times a day   - Out of bed for meals  times a day  - Out of bed for toileting  - Record patient progress and toleration of activity level   Outcome: Progressing     Problem: DISCHARGE PLANNING  Goal: Discharge to home or other facility with appropriate resources  Description: INTERVENTIONS:  - Identify barriers to discharge w/patient and caregiver  - Arrange for needed discharge resources and transportation as appropriate  - Identify discharge learning needs (meds, wound care, etc )  - Arrange for interpretive services to assist at discharge as needed  - Refer to Case Management Department for coordinating discharge planning if the patient needs post-hospital services based on physician/advanced practitioner order or complex needs related to functional status, cognitive ability, or social support system  Outcome: Progressing     Problem: Knowledge Deficit  Goal: Patient/family/caregiver demonstrates understanding of disease process, treatment plan, medications, and discharge instructions  Description: Complete learning assessment and assess knowledge base    Interventions:  - Provide teaching at level of understanding  - Provide teaching via preferred learning methods  Outcome: Progressing

## 2022-01-27 NOTE — CASE MANAGEMENT
Case Management Discharge Planning Note    Patient name Noam Grace  Location /-08 MRN [id number]  : 1950 Date 2022       Current Admission Date: 2022  Current Admission Diagnosis:NSTEMI (non-ST elevated myocardial infarction) [facility])   Patient Active Problem List    Diagnosis Date Noted    [facility] 20[facility]    Type 2 diabetes mellitus, without long-term current use of insulin ([facility] ) 2022    BPH (benign prostatic hyperplasia) 2022    KASEY (obstructive sleep apnea) 2022    CKD (chronic kidney disease) 2022      LOS (days): 1  Geometric Mean LOS (GMLOS) (days): 2 20  Days to GMLOS:0 9     OBJECTIVE:  Risk of Unplanned Readmission Score: 8         Current admission status: Inpatient   Preferred Pharmacy:   [address] [place] [address], [address] [place] [address]  Phone: [phone] Fax: [phone]    [facility] [employer] [address] [place] [address]  Phone: [phone] Fax: [phone]    [id number] [address]  Phone: [phone] Fax: [phone]    Primary Care Provider: No primary care provider on file  Primary Insurance: [facility]  Secondary Insurance:     DISCHARGE DETAILS:    Additional Comments: CM was asked to maguire check Marailinlarry  CM saw that script was sent to [facility]  CM requested physician re-send script to a local pharmacy as plan is for discharge home today  [facility] [place] - CM called ([phone]) and spoke with Clemencia Domingo who reports that her computer was telling her that it's too soon to fill the script as if it were fulfilled elsewhere  [facility] called [facility] ([phone]) and spoke with Ric Bond who confirmed that the scripts have been canceled on their end   CM met with pt and wife at bedside, wife reports that she absolutely does not want meds going to [facility] in [place], [facility] [address]  Pt's wife also inquring about referrals to a nutritionist, etc as well as inquiring about cpap  CM sent TT to Dr Mary Ellis requesting she speak with family at bedside  Scripts were re-sent to [facility]  CM called P: [phone] and spoke with Tapan To who informed that [address] [place] will be a $24 30 co-pay  Pt and wife aware and agreeable to out of pocket cost associated  Pt and wife interested in [facility] for [facility]  Referrals placed, awaiting accepting agency  CM department to follow

## 2022-01-27 NOTE — TELEPHONE ENCOUNTER
----- Message from Gerardo Leblanc PA-C sent at 1/27/2022  1:16 PM EST -----  Regarding: [facility]  Cardiology Follow-up:    Patient clinical visit in 1 week at the Cardio location: [place] office. .    Schedule visit with Cardio Campos Providers: Arely Valera.    Type of Visit: VISIT TYPE: in-person office visit.

## 2022-01-27 NOTE — PROGRESS NOTES
Cardiology Progress Note   Thang Coleman 73 y o  male MRN: [id number]    Unit/Bed#: -01 Encounter: [id number]      Assessment:  1  NSTEMI type 1 - hs trop max 955  2  CAD s/p VIDA x1 to mRCA  3  Hypertension   4  Hyperlipidemia   5  DM type 2 - A1c 6 6%    Plan:  Continue DAPT with ASA and Brillinta for at least 1 year post-PCI  Case management was consulted for affordability of Brillinta, if he cannot afford it, he will need to be switched to Plavix  Continue atorvastatin 40mg daily and Toprol XL 25mg daily  Recommend cardiac rehab on discharge    Cardiology signing off at present time  Please call with any questions or concerns  Will arrange for outpatient follow-up in 1-2 weeks post discharge  Diagnostics:  HS troponin trend: 279/955  NTproBNP: 54    Cardiac catheterization 1/26/2022:  99% stenosis of the mid RCA s/p PCI with VIDA    KEYA 1/26/2022:     Left Ventricle: Left ventricular cavity size is normal  The left ventricular ejection fraction is 60%  Systolic function is normal  Wall motion is otherwise normal  Diastolic function is normal  Wall thickness is increased  There is mild to moderate concentric hypertrophy    Right Ventricle: Right ventricular cavity size is mildly dilated    Mitral Valve: There is mild annular calcification  Subjective:   Patient seen and examined  Overnight events reviewed  Patient denies any acute complaints at this time  Objective:     Vitals: Blood pressure 154/78, pulse 67, temperature 97 9 °F (36 6 °C), resp  rate 18, height 6' (1 829 m), weight 122 kg (270 lb), SpO2 95 %  , Body mass index is 36 62 kg/m² ,   Orthostatic Blood Pressures      Most Recent Value   Blood Pressure 154/78 filed at 01/27/2022 0815   Patient Position - Orthostatic VS Lying filed at 01/26/2022 0622            Intake/Output Summary (Last 24 hours) at 1/27/2022 1259  Last data filed at 1/27/2022 0800  Gross per 24 hour   Intake 596 ml   Output 1360 ml   Net -764 ml     Physical Exam:  GEN: Thang Coleman appears well, alert and oriented x 3, pleasant and cooperative   HEENT: Sclera anicteric, conjunctivae pink, mucous membranes moist  Oropharynx clear  NECK: supple, no significant JVD, Trachea midline, no thyromegaly  HEART: regular rhythm, normal S1 and S2, no murmurs, clicks, gallops or rubs   LUNGS: clear to auscultation bilaterally; no wheezes, rales, or rhonchi  No increased work of breathing or signs of respiratory distress  ABDOMEN: Soft, nontender, nondistended  EXTREMITIES: Skin warm and well perfused, no clubbing, cyanosis, or edema  NEURO: No focal findings  Normal speech  Mood and affect normal    SKIN: Normal without suspicious lesions on exposed skin      Medications:    Current Facility-Administered Medications:     acetaminophen (TYLENOL) tablet 650 mg, 650 mg, Oral, Q6H PRN, Juli Reyes PA-C, 650 mg at 01/26/22 1713    aspirin chewable tablet 81 mg, 81 mg, Oral, Daily, Juli Reyes PA-C, 81 mg at 01/27/22 0958    atorvastatin (LIPITOR) tablet 40 mg, 40 mg, Oral, QPM, Juli Reyes PA-C, 40 mg at 01/26/22 1831    hydrALAZINE (APRESOLINE) injection 5 mg, 5 mg, Intravenous, Q6H PRN, Rekha Diaz PA-C    insulin lispro (HumaLOG) 100 units/mL subcutaneous injection 1-5 Units, 1-5 Units, Subcutaneous, HS, Juli Reyes PA-C    insulin lispro (HumaLOG) 100 units/mL subcutaneous injection 2-12 Units, 2-12 Units, Subcutaneous, TID AC **AND** Fingerstick Glucose (POCT), , , 4x Daily AC and at bedtime, Sawyer Carrillo PA-C    metoprolol succinate (TOPROL-XL) 24 hr tablet 25 mg, 25 mg, Oral, Daily, Juli Reyes PA-C, 25 mg at 01/27/22 0958    nitroglycerin (NITROSTAT) SL tablet 0 4 mg, 0 4 mg, Sublingual, Q5 Min PRN, Juli Reyes PA-C    ondansetron (ZOFRAN) injection 4 mg, 4 mg, Intravenous, Q6H PRN, Sawyer Carrillo PA-C    oxybutynin (DITROPAN) tablet 5 mg, 5 mg, Oral, BID, Juli Reyes PA-C, 5 mg at 01/27/22 0958    ticagrelor (BRILINTA) tablet 90 mg, 90 mg, Oral, Q12H [address], Isabel Byers, CRNP, 90 mg at 01/27/22 7326    timolol (TIMOPTIC) 0 5 % ophthalmic solution 1 drop, 1 drop, Right Eye, TID, Shashank King PA-C, 1 drop at 01/27/22 0959     Labs & Results:        Results from last 7 days   Lab Units 01/27/22  0551 01/26/22  0602 01/26/22  0321   WBC Thousand/uL 8 39 9 63 9 74   HEMOGLOBIN g/dL 15 0 14 5 14 5   HEMATOCRIT % 43 3 42 4 42 7   PLATELETS Thousands/uL 138* 139* 142*         Results from last 7 days   Lab Units 01/27/22  0551 01/26/22  0321   POTASSIUM mmol/L 4 0 4 6   CHLORIDE mmol/L 105 104   CO2 mmol/L 28 28   BUN mg/dL 13 22   CREATININE mg/dL 1 11 1 34*   CALCIUM mg/dL 8 7 8 7   ALK PHOS U/L 121* 119*   ALT U/L 44 41   AST U/L 51* 22     Results from last 7 days   Lab Units 01/27/22  0559 01/26/22  0602   INR   --  1 01   PTT seconds 27 26     Results from last 7 days   Lab Units 01/27/22  0551   MAGNESIUM mg/dL 1 9

## 2022-01-28 ENCOUNTER — TELEPHONE (OUTPATIENT)
Dept: CARDIOLOGY CLINIC | Facility: CLINIC | Age: 72
End: 2022-01-28

## 2022-01-28 NOTE — TELEPHONE ENCOUNTER
Pt's wife called & stated that [facility] needs a copy of pt's cardiac rehab referral to be faxed to [phone]. Pt does have an appt set up with [facility] cardiac rehab 2/7/22.       Please call pt when order is faxed over

## 2022-01-28 NOTE — UTILIZATION REVIEW
Notification of Discharge   This is a Notification of Discharge from our facility [address] [place] [address]  Please be advised that this patient has been discharge from our facility  Below you will find the admission and discharge date and time including the patients disposition  UTILIZATION REVIEW CONTACT:  Parveen Kang  Utilization   Network Utilization Review Department  Phone: [phone] x carefully listen to the prompts  All voicemails are confidential   Email: [email or web address]     PHYSICIAN ADVISORY SERVICES:  FOR [id number] REVIEW - MEDICAL NECESSITY DENIAL  Phone: [phone]  Fax: [phone]  Email: [email or web address]     PRESENTATION DATE: 1/26/2022  2:49 AM  OBERVATION ADMISSION DATE:   INPATIENT ADMISSION DATE: 1/26/22  5:39 AM   DISCHARGE DATE: 1/27/2022  2:04 PM  DISPOSITION: Home/Self Care Home/Self Care      IMPORTANT INFORMATION:  Send all requests for admission clinical reviews, approved or denied determinations and any other requests to dedicated fax number below belonging to the campus where the patient is receiving treatment   List of dedicated fax numbers:  [address] DENIALS (Administrative/Medical Necessity) [phone]   [address] (Maternity/NICU/Pediatrics) [phone]   Joyce Flatten [phone]   Bryan Alvarado [phone]   Kaye Retana [phone]   [address] [place] [address],[address] [phone]   [facility]  [phone]   [address] [place], [place] [address] [facility] 1000 [facility] [phone]

## 2022-01-28 NOTE — TELEPHONE ENCOUNTER
Name of Caller:Ansley from [address] [place] [address] [address] Cardiac rehab left a vm      Problem/Symptoms:Needs an order from Dr Briseno for Cardiac Rehab        Call back number:Ansley - [phone]    Fax - [phone]    Last or Next appt:

## 2022-01-31 ENCOUNTER — TELEPHONE (OUTPATIENT)
Dept: CARDIOLOGY CLINIC | Facility: CLINIC | Age: 72
End: 2022-01-31

## 2022-01-31 NOTE — TELEPHONE ENCOUNTER
Cardiac rehab form and all necessary paper work was faxed to [address] [place] [address] receipt was scanned into pts chart

## 2022-02-03 ENCOUNTER — OFFICE VISIT (OUTPATIENT)
Dept: CARDIOLOGY CLINIC | Facility: CLINIC | Age: 72
End: 2022-02-03
Payer: COMMERCIAL

## 2022-02-03 VITALS
SYSTOLIC BLOOD PRESSURE: 126 MMHG | RESPIRATION RATE: 16 BRPM | HEIGHT: 72 IN | BODY MASS INDEX: 37.52 KG/M2 | HEART RATE: 77 BPM | WEIGHT: 277 LBS | DIASTOLIC BLOOD PRESSURE: 78 MMHG | OXYGEN SATURATION: 96 %

## 2022-02-03 DIAGNOSIS — E66.01 OBESITY, MORBID (HCC): Primary | ICD-10-CM

## 2022-02-03 DIAGNOSIS — Z95.5 S/P CORONARY ARTERY STENT PLACEMENT: ICD-10-CM

## 2022-02-03 DIAGNOSIS — I25.119 CORONARY ARTERY DISEASE INVOLVING NATIVE CORONARY ARTERY OF NATIVE HEART WITH ANGINA PECTORIS (HCC): ICD-10-CM

## 2022-02-03 PROCEDURE — 99214 OFFICE O/P EST MOD 30 MIN: CPT | Performed by: INTERNAL MEDICINE

## 2022-02-03 RX ORDER — ATORVASTATIN CALCIUM 40 MG/1
40 TABLET, FILM COATED ORAL EVERY EVENING
Qty: 90 TABLET | Refills: 3 | Status: SHIPPED | OUTPATIENT
Start: 2022-02-03 | End: 2022-03-05

## 2022-02-03 RX ORDER — NETARSUDIL AND LATANOPROST OPHTHALMIC SOLUTION, 0.02%/0.005% .2; .05 MG/ML; MG/ML
SOLUTION/ DROPS OPHTHALMIC; TOPICAL
COMMUNITY

## 2022-02-03 RX ORDER — LATANOPROST 50 UG/ML
SOLUTION/ DROPS OPHTHALMIC
COMMUNITY

## 2022-02-03 RX ORDER — METOPROLOL SUCCINATE 25 MG/1
25 TABLET, EXTENDED RELEASE ORAL DAILY
Qty: 90 TABLET | Refills: 3 | Status: SHIPPED | OUTPATIENT
Start: 2022-02-03 | End: 2022-03-05

## 2022-02-03 RX ORDER — FUROSEMIDE 20 MG/1
20 TABLET ORAL DAILY
Qty: 90 TABLET | Refills: 3 | Status: SHIPPED | OUTPATIENT
Start: 2022-02-03

## 2022-02-03 RX ORDER — BIMATOPROST 0.3 MG/ML
1 SOLUTION/ DROPS OPHTHALMIC
COMMUNITY

## 2022-02-03 RX ORDER — OXYBUTYNIN CHLORIDE 10 MG/1
10 TABLET, EXTENDED RELEASE ORAL DAILY
COMMUNITY

## 2022-02-03 RX ORDER — DORZOLAMIDE HYDROCHLORIDE AND TIMOLOL MALEATE 20; 5 MG/ML; MG/ML
1 SOLUTION/ DROPS OPHTHALMIC
COMMUNITY

## 2022-02-03 NOTE — PROGRESS NOTES
Cardiology Follow Up    Kristel Augustin  1950  [id number]  [facility] [address] [place] [address] [place] [address] [phone]    Discussion/Summary:  1  CAD s/p PCI to RCA  2  Chronic diastolic heart failure  3  Hypertension  4  Dyslipidemia  5  DM      Coronary artery disease:  Continue with aspirin and Brilinta  Continue with metoprolol and statin  Strongly encouraged compliance with medications including dual anti-platelet therapy  Discussed in detail regarding risks of discontinuation of DAPT including stent thrombosis  Patient demonstrates clear understanding and ability to follow recommendations  Will refer to cardiac rehab    Diastolic heart failure:  He has lower limb edema  Will start Lasix 20 mg daily  Will check BMP in 1 week  Hypertension:  Blood pressure is well controlled  Continue current medications  Dyslipidemia:  Continue with statin therapy  Diabetes: On statin  Will consider starting ACE-inhibitor at next visit  Recommend patient presents to the emergency room or call our office if he has any new/persistent or progressive symptoms  Previous studies were reviewed  Safety measures were reviewed  Questions were entertained and answered  Patient was advised to report any problems requiring medical attention  Follow-up with PCP and appropriate specialist and lab work as discussed  Return for follow up visit as scheduled or earlier, if needed  Thank you for allowing me to participate in the care and evaluation of your patient  Should you have any questions, please feel free to contact me  History of Present Illness:     Kristel Augustin is a 73 y o  male who presents for follow up for cardiovascular care  Denies chest pain, chest pressure, shortness of breath, dizziness, lightheadedness, presyncope or syncope    Denies orthopnea, PND or lower limb edema  Patient presented in late January 2022 with non STEMI and chest pain  Underwent cardiac catheterization  Underwent PCI to RCA  He  was started on aspirin and Brilinta  Has been compliant  Has been doing well  Looking fall to cardiac rehab  Cardiac catheterization 1/26th 2022  · There is a 99% mid RCA stenosis s/p PCI with VIDA (4 x 28 mm)  Excelletn result  0% residual stenosis  TERRENCE 3 flow at completion  Mid LAD lesion is 60% stenosed  The lesion is moderately calcified  iFR was measured in the Mid LAD  iFR ratio: 0 98  The iFR was nonsignificant, not intervened          Patient Active Problem List   Diagnosis    NSTEMI (non-ST elevated myocardial infarction) ([place] [address] )    Type 2 diabetes mellitus, without long-term current use of insulin ([facility]    BPH (benign prostatic hyperplasia)    KASEY (obstructive sleep apnea)    CKD (chronic kidney disease)    Obesity, morbid ([place] [address] )     Past Medical History:   Diagnosis Date    Coronary artery disease     Diabetes mellitus ([place] [address] )     Hypertension      Social History     Socioeconomic History    Marital status: /Civil Union     Spouse name: Not on file    Number of children: Not on file    Years of education: Not on file    Highest education level: Not on file   Occupational History    Not on file   Tobacco Use    Smoking status: Never Smoker    Smokeless tobacco: Never Used   Vaping Use    Vaping Use: Never used   Substance and Sexual Activity    Alcohol use: Not Currently    Drug use: Never    Sexual activity: Yes     Partners: Female   Other Topics Concern    Not on file   Social History Narrative    Not on file     Social Determinants of Health     Financial Resource Strain: Not on file   Food Insecurity: No Food Insecurity    Worried About Running Out of Food in the Last Year: Never true    Marvel of Food in the Last Year: Never true   Transportation Needs: No Transportation Needs    Lack of Transportation (Medical):  No  Lack of Transportation (Non-Medical): No   Physical Activity: Not on file   Stress: Not on file   Social Connections: Not on file   Intimate Partner Violence: Not on file   Housing Stability: Low Risk     Unable to Pay for Housing in the Last Year: No    Number of Places Lived in the Last Year: 1    Unstable Housing in the Last Year: No      History reviewed  No pertinent family history  Past Surgical History:   Procedure Laterality Date    CARDIAC CATHETERIZATION N/A 1/26/2022    Procedure: Cardiac catheterization;  Surgeon: Kianna Mathew MD;  Location: [address] [place] [address] CATH LAB; Service: Cardiology    CARDIAC CATHETERIZATION N/A 1/26/2022    Procedure: Cardiac Coronary Angiogram;  Surgeon: Kianna Mathew MD;  Location: [address] [place] [address] CATH LAB; Service: Cardiology    CARDIAC CATHETERIZATION Left 1/26/2022    Procedure: Cardiac Left Heart Cath;  Surgeon: Kianna Mathew MD;  Location: [address] [place] [address] CATH LAB; Service: Cardiology    CARDIAC CATHETERIZATION N/A 1/26/2022    Procedure: Cardiac pci;  Surgeon: Kianna Mathew MD;  Location: [address] [place] [address] CATH LAB;   Service: Cardiology    FL INJECTION RIGHT KNEE (ARTHROGRAM)  2018       Current Outpatient Medications:     aspirin 81 mg chewable tablet, Chew 1 tablet (81 mg total) daily, Disp: 30 tablet, Rfl: 0    atorvastatin (LIPITOR) 40 mg tablet, Take 1 tablet (40 mg total) by mouth every evening, Disp: 90 tablet, Rfl: 3    bimatoprost (LUMIGAN) 0 03 % ophthalmic drops, Apply 1 drop to eye, Disp: , Rfl:     dorzolamide-timolol (COSOPT) 22 3-6 8 MG/ML ophthalmic solution, Apply 1 drop to eye, Disp: , Rfl:     latanoprost (XALATAN) 0 005 % ophthalmic solution, latanoprost 0 005 % eye drops  1 DROP IN RIGHT EYE NIGHTLY, Disp: , Rfl:     metFORMIN (GLUCOPHAGE) 1000 MG tablet, Take 1,000 mg by mouth 2 (two) times a day, Disp: , Rfl:     metoprolol succinate (TOPROL-XL) 25 mg 24 hr tablet, Take 1 tablet (25 mg total) by mouth daily, Disp: 90 tablet, Rfl: 3   Netarsudil-Latanoprost (Rocklatan) 0 02-0 005 % SOLN, Apply to eye, Disp: , Rfl:     oxybutynin (DITROPAN-XL) 10 MG 24 hr tablet, Take 10 mg by mouth daily, Disp: , Rfl:     ticagrelor (Brilinta) 90 MG, Take 1 tablet (90 mg total) by mouth every 12 (twelve) hours, Disp: 180 tablet, Rfl: 3  Allergies   Allergen Reactions    Simvastatin Cough    Losartan Other (See Comments)     cough  Other reaction(s): (PIMS)         Labs:  Lab Results   Component Value Date    WBC 8 39 01/27/2022    HGB 15 0 01/27/2022    HCT 43 3 01/27/2022    MCV 94 01/27/2022     (L) 01/27/2022     Lab Results   Component Value Date    CALCIUM 8 7 01/27/2022    K 4 0 01/27/2022    CO2 28 01/27/2022     01/27/2022    BUN 13 01/27/2022    CREATININE 1 11 01/27/2022     Lab Results   Component Value Date    HGBA1C 6 7 (H) 02/01/2022         Review of Systems:  Review of Systems   Constitutional: Negative for activity change, appetite change, fatigue and fever  Respiratory: Negative for chest tightness, shortness of breath and wheezing  Cardiovascular: Positive for leg swelling  Negative for chest pain and palpitations  Neurological: Negative for dizziness, speech difficulty, weakness and light-headedness  Psychiatric/Behavioral: Negative for agitation  All other systems reviewed and are negative  Physical Exam:  Physical Exam  Vitals and nursing note reviewed  Constitutional:       General: He is not in acute distress  Appearance: Normal appearance  He is not ill-appearing or diaphoretic  HENT:      Head: Normocephalic and atraumatic  Eyes:      Extraocular Movements: Extraocular movements intact  Cardiovascular:      Rate and Rhythm: Normal rate and regular rhythm  Heart sounds: No murmur heard  No friction rub  No gallop  Pulmonary:      Effort: Pulmonary effort is normal  No respiratory distress  Breath sounds: Normal breath sounds  Abdominal:      General: There is no distension  Palpations: Abdomen is soft  Musculoskeletal:         General: No swelling  Normal range of motion  Skin:     General: Skin is warm and dry  Capillary Refill: Capillary refill takes less than 2 seconds  Coloration: Skin is not pale  Neurological:      General: No focal deficit present  Mental Status: He is alert and oriented to person, place, and time     Psychiatric:         Mood and Affect: Mood normal

## 2022-02-18 ENCOUNTER — APPOINTMENT (OUTPATIENT)
Dept: LAB | Facility: CLINIC | Age: 72
End: 2022-02-18
Payer: COMMERCIAL

## 2022-02-18 LAB
ANION GAP SERPL CALCULATED.3IONS-SCNC: 7 MMOL/L (ref 4–13)
BUN SERPL-MCNC: 27 MG/DL (ref 5–25)
CALCIUM SERPL-MCNC: 10.1 MG/DL (ref 8.3–10.1)
CHLORIDE SERPL-SCNC: 107 MMOL/L (ref 100–108)
CO2 SERPL-SCNC: 26 MMOL/L (ref 21–32)
CREAT SERPL-MCNC: 1.28 MG/DL (ref 0.6–1.3)
GFR SERPL CREATININE-BSD FRML MDRD: 56 ML/MIN/1.73SQ M
GLUCOSE P FAST SERPL-MCNC: 145 MG/DL (ref 65–99)
POTASSIUM SERPL-SCNC: 3.9 MMOL/L (ref 3.5–5.3)
SODIUM SERPL-SCNC: 140 MMOL/L (ref 136–145)

## 2022-02-18 PROCEDURE — 80048 BASIC METABOLIC PNL TOTAL CA: CPT | Performed by: INTERNAL MEDICINE

## 2022-02-18 PROCEDURE — 36415 COLL VENOUS BLD VENIPUNCTURE: CPT | Performed by: INTERNAL MEDICINE

## 2022-03-01 ENCOUNTER — TELEPHONE (OUTPATIENT)
Dept: CARDIOLOGY CLINIC | Facility: CLINIC | Age: 72
End: 2022-03-01

## 2022-03-01 NOTE — TELEPHONE ENCOUNTER
S/w wife  States their dog scratched pt on the back of his hand over the vein yesterday  States it is still bleeding in one pin hole spot  States she has been applying pressure and that area will not stop bleeding  States the area does not look deep  She wants to know if he can hold Brilinta and ASA to see if that will help with the bleeding?

## 2022-03-01 NOTE — TELEPHONE ENCOUNTER
Name of Caller: patient wife     Problem/Symptoms: scratched by his dog and has been bleeding since         Call back number: [phone]

## 2022-03-02 NOTE — TELEPHONE ENCOUNTER
Please let patient know that she should not hold aspirin or brillinta  They should go to the ER if he continues to have bleeding   Thanks  AQ

## 2022-04-08 ENCOUNTER — TELEPHONE (OUTPATIENT)
Dept: CARDIOLOGY CLINIC | Facility: CLINIC | Age: 72
End: 2022-04-08

## 2022-04-08 NOTE — TELEPHONE ENCOUNTER
Fax received from [address] [place] [address]  Forms faxed to centralized faxing team  Confirmation received  Forms scanned in pt chart  Original forms placed in your folder

## 2022-10-31 ENCOUNTER — OFFICE VISIT (OUTPATIENT)
Dept: CARDIOLOGY CLINIC | Facility: CLINIC | Age: 72
End: 2022-10-31

## 2022-10-31 VITALS
HEIGHT: 72 IN | WEIGHT: 266 LBS | DIASTOLIC BLOOD PRESSURE: 80 MMHG | BODY MASS INDEX: 36.03 KG/M2 | HEART RATE: 85 BPM | OXYGEN SATURATION: 98 % | RESPIRATION RATE: 16 BRPM | SYSTOLIC BLOOD PRESSURE: 140 MMHG

## 2022-10-31 DIAGNOSIS — I25.10 CORONARY ARTERY DISEASE INVOLVING NATIVE HEART WITHOUT ANGINA PECTORIS, UNSPECIFIED VESSEL OR LESION TYPE: Primary | ICD-10-CM

## 2022-10-31 NOTE — PROGRESS NOTES
Cardiology Follow Up    Diandra Pillai  1950  [id number]  [facility] [place] [address] [place] [address] [phone]    Discussion/Summary:  1  CAD s/p PCI to RCA  2  Chronic diastolic heart failure  3  Hypertension  4  Dyslipidemia  5  DM    Coronary artery disease:  Continue with aspirin and Brilinta  Continue with metoprolol and statin  Diastolic heart failure:  Stopped his lasix  No edema ,orthopnea or PND     Hypertension:  Blood pressure is elevaed  Continue current medications  Discussed adding ACEi/ARB  Patient does not want it as he had side effects to it in the past      Dyslipidemia:  Continue with statin therapy      Diabetes: On statin  He has tried losartan in the past and stopped due to allergies  Is not interested in restarting ACEi/ARB due to side effects     Recommend patient presents to the emergency room or call our office if he has any new/persistent or progressive symptoms  Previous studies were reviewed  Safety measures were reviewed  Questions were entertained and answered  Patient was advised to report any problems requiring medical attention  Follow-up with PCP and appropriate specialist and lab work as discussed  Return for follow up visit as scheduled or earlier, if needed  Thank you for allowing me to participate in the care and evaluation of your patient  Should you have any questions, please feel free to contact me  History of Present Illness:     Diandra Pillai is a 81 y o  male who presents for follow up for cardiovascular care  Doing well  No complaints  Denies chest pain, chest pressure, shortness of breath, dizziness, lightheadedness, presyncope or syncope  Denies orthopnea, PND or lower limb edema  Over the summer, he has been playing baseball  Has been keeping himself active    Rides her stationary bike for 30 minutes every day with no symptoms  Compliant with his aspirin and Brilinta  Has refills  Patient Active Problem List   Diagnosis   • NSTEMI (non-ST elevated myocardial infarction) ([facility])   • Type 2 diabetes mellitus, without long-term current use of insulin ([facility])   • BPH (benign prostatic hyperplasia)   • KASEY (obstructive sleep apnea)   • CKD (chronic kidney disease)   • Obesity, morbid ([facility] )     Past Medical History:   Diagnosis Date   • Coronary artery disease    • Diabetes mellitus ([facility] )    • Hypertension      Social History     Socioeconomic History   • Marital status: /Civil Union     Spouse name: Not on file   • Number of children: Not on file   • Years of education: Not on file   • Highest education level: Not on file   Occupational History   • Not on file   Tobacco Use   • Smoking status: Never Smoker   • Smokeless tobacco: Never Used   Vaping Use   • Vaping Use: Never used   Substance and Sexual Activity   • Alcohol use: Not Currently   • Drug use: Never   • Sexual activity: Yes     Partners: Female   Other Topics Concern   • Not on file   Social History Narrative   • Not on file     Social Determinants of Health     Financial Resource Strain: Not on file   Food Insecurity: No Food Insecurity   • Worried About Running Out of Food in the Last Year: Never true   • Ran Out of Food in the Last Year: Never true   Transportation Needs: No Transportation Needs   • Lack of Transportation (Medical): No   • Lack of Transportation (Non-Medical): No   Physical Activity: Not on file   Stress: Not on file   Social Connections: Not on file   Intimate Partner Violence: Not on file   Housing Stability: Low Risk    • Unable to Pay for Housing in the Last Year: No   • Number of Places Lived in the Last Year: 1   • Unstable Housing in the Last Year: No      No family history on file    Past Surgical History:   Procedure Laterality Date   • CARDIAC CATHETERIZATION N/A 1/26/2022    Procedure: Cardiac catheterization;  Surgeon: Venancio Rogers MD;  Location: [address] [place] [address] CATH LAB; Service: Cardiology   • CARDIAC CATHETERIZATION N/A 1/26/2022    Procedure: Cardiac Coronary Angiogram;  Surgeon: Venancio Rogers MD;  Location: [address] [place] [address] CATH LAB; Service: Cardiology   • CARDIAC CATHETERIZATION Left 1/26/2022    Procedure: Cardiac Left Heart Cath;  Surgeon: Venancio Rogers MD;  Location: [address] [place] [address] CATH LAB; Service: Cardiology   • CARDIAC CATHETERIZATION N/A 1/26/2022    Procedure: Cardiac pci;  Surgeon: Venancio Rogers MD;  Location: [address] [place] [address] CATH LAB;   Service: Cardiology   • [place] INJECTION RIGHT KNEE (ARTHROGRAM)  2018       Current Outpatient Medications:   •  aspirin 81 mg chewable tablet, Chew 1 tablet (81 mg total) daily, Disp: 30 tablet, Rfl: 0  •  atorvastatin (LIPITOR) 40 mg tablet, Take 1 tablet (40 mg total) by mouth every evening, Disp: 90 tablet, Rfl: 3  •  dorzolamide-timolol (COSOPT) 22 3-6 8 MG/ML ophthalmic solution, Apply 1 drop to eye, Disp: , Rfl:   •  metFORMIN (GLUCOPHAGE) 1000 MG tablet, Take 1,000 mg by mouth 2 (two) times a day, Disp: , Rfl:   •  metoprolol succinate (TOPROL-XL) 25 mg 24 hr tablet, Take 1 tablet (25 mg total) by mouth daily, Disp: 90 tablet, Rfl: 3  •  Netarsudil-Latanoprost (Rocklatan) 0 02-0 005 % SOLN, Apply to eye, Disp: , Rfl:   •  oxybutynin (DITROPAN-XL) 10 MG 24 hr tablet, Take 10 mg by mouth daily, Disp: , Rfl:   •  ticagrelor (Brilinta) 90 MG, Take 1 tablet (90 mg total) by mouth every 12 (twelve) hours, Disp: 180 tablet, Rfl: 3  •  furosemide (LASIX) 20 mg tablet, Take 1 tablet (20 mg total) by mouth daily (Patient not taking: No sig reported), Disp: 90 tablet, Rfl: 3  Allergies   Allergen Reactions   • Simvastatin Cough   • Losartan Other (See Comments)     cough  Other reaction(s): (PIMS)         Labs:  Lab Results   Component Value Date    WBC 8 39 01/27/2022    HGB 15 0 01/27/2022    HCT 43 3 01/27/2022    MCV 94 01/27/2022     (L) 01/27/2022     Lab Results   Component Value Date    CALCIUM 10 1 02/18/2022    K 3 9 02/18/2022    CO2 26 02/18/2022     02/18/2022    BUN 27 (H) 02/18/2022    CREATININE 1 28 02/18/2022     Lab Results   Component Value Date    HGBA1C 6 8 (H) 09/06/2022       Review of Systems:  Review of Systems   Constitutional: Negative for activity change, appetite change, fatigue and fever  Respiratory: Negative for chest tightness, shortness of breath and wheezing  Cardiovascular: Negative for chest pain, palpitations and leg swelling  Neurological: Negative for dizziness, speech difficulty, weakness and light-headedness  Psychiatric/Behavioral: Negative for agitation  All other systems reviewed and are negative  Physical Exam:  Physical Exam  Vitals and nursing note reviewed  Constitutional:       General: He is not in acute distress  Appearance: Normal appearance  He is not ill-appearing or diaphoretic  HENT:      Head: Normocephalic and atraumatic  Eyes:      Extraocular Movements: Extraocular movements intact  Cardiovascular:      Rate and Rhythm: Normal rate and regular rhythm  Heart sounds: No murmur heard  No friction rub  No gallop  Pulmonary:      Effort: Pulmonary effort is normal  No respiratory distress  Breath sounds: Normal breath sounds  Abdominal:      General: There is no distension  Palpations: Abdomen is soft  Musculoskeletal:         General: No swelling  Normal range of motion  Skin:     General: Skin is warm and dry  Capillary Refill: Capillary refill takes less than 2 seconds  Coloration: Skin is not pale  Neurological:      General: No focal deficit present  Mental Status: He is alert and oriented to person, place, and time     Psychiatric:         Mood and Affect: Mood normal

## 2023-02-01 ENCOUNTER — OFFICE VISIT (OUTPATIENT)
Dept: CARDIOLOGY CLINIC | Facility: CLINIC | Age: 73
End: 2023-02-01

## 2023-02-01 VITALS
HEART RATE: 100 BPM | HEIGHT: 72 IN | DIASTOLIC BLOOD PRESSURE: 86 MMHG | OXYGEN SATURATION: 97 % | SYSTOLIC BLOOD PRESSURE: 140 MMHG | WEIGHT: 271 LBS | BODY MASS INDEX: 36.7 KG/M2 | RESPIRATION RATE: 18 BRPM

## 2023-02-01 DIAGNOSIS — I25.10 CORONARY ARTERY DISEASE INVOLVING NATIVE CORONARY ARTERY OF NATIVE HEART WITHOUT ANGINA PECTORIS: Primary | ICD-10-CM

## 2023-02-01 NOTE — PATIENT INSTRUCTIONS
Recommend patient presents to the emergency room or call our office if he has any new/persistent or progressive symptoms 
Normal for race

## 2023-02-01 NOTE — PROGRESS NOTES
Cardiology Follow Up    Chong Kahn  1950  [id number]  [facility] [place] [address] [place] [id number] [phone]    Discussion/Summary:  1  CAD s/p PCI to RCA  2  Chronic diastolic heart failure  3  Hypertension  4  Dyslipidemia  5  DM    Doing well  No complaints  Has been one year since his PCI  Complaints of bruising   Will stop brillinta  Cont with ASA  COnt with metoprolol and statin  Euvolemic on exam      He will inform us if he has any cardiac symptoms  Follow up in 6 months  Recommend patient presents to the emergency room or call our office if he has any new/persistent or progressive symptoms  Previous studies were reviewed  Safety measures were reviewed  Questions were entertained and answered  Patient was advised to report any problems requiring medical attention  Follow-up with PCP and appropriate specialist and lab work as discussed  Return for follow up visit as scheduled or earlier, if needed  Thank you for allowing me to participate in the care and evaluation of your patient  Should you have any questions, please feel free to contact me  History of Present Illness:     Chong Kahn is a 81 y o  male who presents for follow up for cardiovascular care  Denies chest pain, chest pressure, shortness of breath, dizziness, lightheadedness, presyncope or syncope  Denies orthopnea, PND or lower limb edema  Cardiac cath 1/26/22:  • There is a 99% mid RCA stenosis s/p PCI with VIDA (4 x 28 mm)  Excelletn result  0% residual stenosis  TERRENCE 3 flow at completion        Patient Active Problem List   Diagnosis   • NSTEMI (non-ST elevated myocardial infarction) ([facility] )   • Type 2 diabetes mellitus, without long-term current use of insulin (HCC)   • BPH (benign prostatic hyperplasia)   • KASEY (obstructive sleep apnea)   • CKD (chronic kidney disease)   • Obesity, morbid ([facility] )     Past Medical History:   Diagnosis Date   • Coronary artery disease    • [facility] mellitus [facility] )    • Hypertension      Social History     Socioeconomic History   • Marital status: /Civil Union     Spouse name: Not on file   • Number of children: Not on file   • Years of education: Not on file   • Highest education level: Not on file   Occupational History   • Not on file   Tobacco Use   • Smoking status: Never   • Smokeless tobacco: Never   Vaping Use   • Vaping Use: Never used   Substance and Sexual Activity   • Alcohol use: Not Currently   • Drug use: Never   • Sexual activity: Yes     Partners: Female   Other Topics Concern   • Not on file   Social History Narrative   • Not on file     Social Determinants of Health     Financial Resource Strain: Not on file   Food Insecurity: Not on file   Transportation Needs: Not on file   Physical Activity: Not on file   Stress: Not on file   Social Connections: Not on file   Intimate Partner Violence: Not on file   Housing Stability: Not on file      History reviewed  No pertinent family history  Past Surgical History:   Procedure Laterality Date   • CARDIAC CATHETERIZATION N/A 1/26/2022    Procedure: Cardiac catheterization;  Surgeon: Elizabeth Ceja MD;  Location: [address] [place] [address] CATH LAB; Service: Cardiology   • CARDIAC CATHETERIZATION N/A 1/26/2022    Procedure: Cardiac Coronary Angiogram;  Surgeon: Elizabeth Ceja MD;  Location: [address] [place] [address] CATH LAB; Service: Cardiology   • CARDIAC CATHETERIZATION Left 1/26/2022    Procedure: Cardiac Left Heart Cath;  Surgeon: Elizabeth Ceja MD;  Location: [address] [place] [address] CATH LAB; Service: Cardiology   • CARDIAC CATHETERIZATION N/A 1/26/2022    Procedure: Cardiac pci;  Surgeon: Elizabeth Ceja MD;  Location: [address] [place] [address] CATH LAB;   Service: Cardiology   • [place] INJECTION RIGHT KNEE (ARTHROGRAM)  2018       Current Outpatient Medications:   •  aspirin 81 mg chewable tablet, Chew 1 tablet (81 mg total) daily, Disp: 30 tablet, Rfl: 0  •  atorvastatin (LIPITOR) 40 mg tablet, Take 1 tablet (40 mg total) by mouth every evening, Disp: 90 tablet, Rfl: 3  •  dorzolamide-timolol (COSOPT) 22 3-6 8 MG/ML ophthalmic solution, Apply 1 drop to eye, Disp: , Rfl:   •  metFORMIN (GLUCOPHAGE) 1000 MG tablet, Take 1,000 mg by mouth 2 (two) times a day, Disp: , Rfl:   •  metoprolol succinate (TOPROL-XL) 25 mg 24 hr tablet, Take 1 tablet (25 mg total) by mouth daily, Disp: 90 tablet, Rfl: 3  •  Netarsudil-Latanoprost (Rocklatan) 0 02-0 005 % SOLN, Apply to eye, Disp: , Rfl:   •  oxybutynin (DITROPAN-XL) 10 MG 24 hr tablet, Take 10 mg by mouth daily, Disp: , Rfl:   •  ticagrelor (Brilinta) 90 MG, Take 1 tablet (90 mg total) by mouth every 12 (twelve) hours, Disp: 180 tablet, Rfl: 3  Allergies   Allergen Reactions   • Simvastatin Cough   • Losartan Other (See Comments)     cough  Other reaction(s): (PIMS)         Labs:  Lab Results   Component Value Date    WBC 8 39 01/27/2022    HGB 15 0 01/27/2022    HCT 43 3 01/27/2022    MCV 94 01/27/2022     (L) 01/27/2022     Lab Results   Component Value Date    CALCIUM 10 1 02/18/2022    K 3 9 02/18/2022    CO2 26 02/18/2022     02/18/2022    BUN 27 (H) 02/18/2022    CREATININE 1 28 02/18/2022     Lab Results   Component Value Date    HGBA1C 6 8 (H) 09/06/2022     No results found for: CHOL  No results found for: HDL  No results found for: LDLCALC  No results found for: TRIG  No results found for: CHOLHDL    Review of Systems:  Review of Systems   Constitutional: Negative for activity change, appetite change, fatigue and fever  Respiratory: Negative for chest tightness, shortness of breath and wheezing  Cardiovascular: Negative for chest pain, palpitations and leg swelling  Gastrointestinal: Negative for nausea and vomiting  Neurological: Negative for dizziness, speech difficulty, weakness and light-headedness  Psychiatric/Behavioral: Negative for agitation     All other systems reviewed and are negative  Physical Exam:  Physical Exam  Vitals and nursing note reviewed  Constitutional:       General: He is not in acute distress  Appearance: Normal appearance  He is not ill-appearing or diaphoretic  HENT:      Head: Normocephalic and atraumatic  Eyes:      Extraocular Movements: Extraocular movements intact  Cardiovascular:      Rate and Rhythm: Normal rate and regular rhythm  Heart sounds: No murmur heard  No friction rub  No gallop  Pulmonary:      Effort: Pulmonary effort is normal  No respiratory distress  Breath sounds: Normal breath sounds  Abdominal:      General: There is no distension  Palpations: Abdomen is soft  Musculoskeletal:         General: No swelling  Normal range of motion  Skin:     General: Skin is warm and dry  Capillary Refill: Capillary refill takes less than 2 seconds  Coloration: Skin is not pale  Neurological:      General: No focal deficit present  Mental Status: He is alert and oriented to person, place, and time     Psychiatric:         Mood and Affect: Mood normal

## 2023-02-13 DIAGNOSIS — I25.119 CORONARY ARTERY DISEASE INVOLVING NATIVE CORONARY ARTERY OF NATIVE HEART WITH ANGINA PECTORIS (HCC): ICD-10-CM

## 2023-02-13 RX ORDER — ATORVASTATIN CALCIUM 40 MG/1
TABLET, FILM COATED ORAL
Qty: 90 TABLET | Refills: 3 | Status: SHIPPED | OUTPATIENT
Start: 2023-02-13

## 2023-07-13 ENCOUNTER — OFFICE VISIT (OUTPATIENT)
Dept: CARDIOLOGY CLINIC | Facility: CLINIC | Age: 73
End: 2023-07-13
Payer: COMMERCIAL

## 2023-07-13 VITALS
HEART RATE: 90 BPM | SYSTOLIC BLOOD PRESSURE: 148 MMHG | DIASTOLIC BLOOD PRESSURE: 84 MMHG | OXYGEN SATURATION: 98 % | HEIGHT: 72 IN | RESPIRATION RATE: 20 BRPM | WEIGHT: 272 LBS | BODY MASS INDEX: 36.84 KG/M2

## 2023-07-13 DIAGNOSIS — I25.119 CORONARY ARTERY DISEASE INVOLVING NATIVE CORONARY ARTERY OF NATIVE HEART WITH ANGINA PECTORIS (HCC): ICD-10-CM

## 2023-07-13 DIAGNOSIS — I25.10 CORONARY ARTERY DISEASE INVOLVING NATIVE CORONARY ARTERY OF NATIVE HEART WITHOUT ANGINA PECTORIS: Primary | ICD-10-CM

## 2023-07-13 PROCEDURE — 99214 OFFICE O/P EST MOD 30 MIN: CPT | Performed by: INTERNAL MEDICINE

## 2023-07-13 RX ORDER — METOPROLOL SUCCINATE 25 MG/1
25 TABLET, EXTENDED RELEASE ORAL DAILY
Qty: 90 TABLET | Refills: 2 | Status: SHIPPED | OUTPATIENT
Start: 2023-07-13 | End: 2023-08-12

## 2023-07-13 NOTE — PROGRESS NOTES
Cardiology Follow Up    Mahogany Eugene  1950  [id number]  [facility] [place]  [address] [place] [address] [phone]    Discussion/Summary:  1. CAD s/p PCI to RCA  2. Chronic diastolic heart failure - appears euvolemic on exam today  3. Hypertension  4. Dyslipidemia  5. DM      Cont with ASA, statin  Not on metoprolol any more. Not sure why stopped. Patient unaware  Will restart metoprolol    DM: On statin. Allergic to ACEi/ARB  States allergic to losartan. Not interested in starting    BP elevated. Will start back metoprolol    He will inform us with onset of cardiac symptoms    Previous studies were reviewed. Safety measures were reviewed. Questions were entertained and answered. Patient was advised to report any problems requiring medical attention. Follow-up with PCP and appropriate specialist and lab work as discussed. Return for follow up visit as scheduled or earlier, if needed. Thank you for allowing me to participate in the care and evaluation of your patient. Should you have any questions, please feel free to contact me. History of Present Illness:     Mahogany Eugene is a 72 y.o. male who presents for follow up for cardiovascular care. Denies chest pain, chest pressure, shortness of breath, dizziness, lightheadedness, presyncope or syncope. Denies orthopnea, PND or lower limb edema. Cardiac cath 1/26/22:  • There is a 99% mid RCA stenosis s/p PCI with VIDA (4 x 28 mm). Excelletn result. 0% residual stenosis. TERRENCE 3 flow at completion.     Patient Active Problem List   Diagnosis   • NSTEMI (non-ST elevated myocardial infarction) ([address])   • Type 2 diabetes mellitus, without long-term current use of insulin (HCC)   • BPH (benign prostatic hyperplasia)   • KASEY (obstructive sleep apnea)   • CKD (chronic kidney disease)   • Obesity, morbid ([address])   • Coronary artery disease involving native coronary artery of native heart without angina pectoris     Past Medical History:   Diagnosis Date   • Coronary artery disease    • Diabetes mellitus ([address])    • Hypertension      Social History     Socioeconomic History   • Marital status: /Civil Union     Spouse name: Not on file   • Number of children: Not on file   • Years of education: Not on file   • Highest education level: Not on file   Occupational History   • Not on file   Tobacco Use   • Smoking status: Never   • Smokeless tobacco: Never   Vaping Use   • Vaping Use: Never used   Substance and Sexual Activity   • Alcohol use: Not Currently   • Drug use: Never   • Sexual activity: Yes     Partners: Female   Other Topics Concern   • Not on file   Social History Narrative   • Not on file     Social Determinants of Health     Financial Resource Strain: Not on file   Food Insecurity: No Food Insecurity (1/26/2022)    Hunger Vital Sign    • Worried About Running Out of Food in the Last Year: Never true    • Ran Out of Food in the Last Year: Never true   Transportation Needs: No Transportation Needs (1/26/2022)    PRAPARE - Transportation    • Lack of Transportation (Medical): No    • Lack of Transportation (Non-Medical): No   Physical Activity: Not on file   Stress: Not on file   Social Connections: Not on file   Intimate Partner Violence: Not on file   Housing Stability: Low Risk  (1/26/2022)    Housing Stability Vital Sign    • Unable to Pay for Housing in the Last Year: No    • Number of Places Lived in the Last Year: 1    • Unstable Housing in the Last Year: No      History reviewed. No pertinent family history. Past Surgical History:   Procedure Laterality Date   • CARDIAC CATHETERIZATION N/A 1/26/2022    Procedure: Cardiac catheterization;  Surgeon: Andreina Carroll MD;  Location: [address] [place] [address] CATH LAB;   Service: Cardiology   • CARDIAC CATHETERIZATION N/A 1/26/2022    Procedure: Cardiac Coronary Angiogram;  Surgeon: Riaz Howard Awa Henderson MD;  Location: [address] CATH LAB; Service: Cardiology   • CARDIAC CATHETERIZATION Left 1/26/2022    Procedure: Cardiac Left Heart Cath;  Surgeon: Mirza Rayo MD;  Location: [address] CATH LAB; Service: Cardiology   • CARDIAC CATHETERIZATION N/A 1/26/2022    Procedure: Cardiac pci;  Surgeon: Mirza Rayo MD;  Location: [address] CATH LAB; Service: Cardiology   • [place] INJECTION RIGHT KNEE (ARTHROGRAM)  2018       Current Outpatient Medications:   •  aspirin 81 mg chewable tablet, Chew 1 tablet (81 mg total) daily, Disp: 30 tablet, Rfl: 0  •  atorvastatin (LIPITOR) 40 mg tablet, TAKE 1 TABLET EVERY EVENING, Disp: 90 tablet, Rfl: 3  •  dorzolamide-timolol (COSOPT) 22.3-6.8 MG/ML ophthalmic solution, Apply 1 drop to eye, Disp: , Rfl:   •  metFORMIN (GLUCOPHAGE) 1000 MG tablet, Take 1,000 mg by mouth 2 (two) times a day, Disp: , Rfl:   •  Netarsudil-Latanoprost (Rocklatan) 0.02-0.005 % SOLN, Apply to eye, Disp: , Rfl:   •  oxybutynin (DITROPAN-XL) 10 MG 24 hr tablet, Take 10 mg by mouth daily, Disp: , Rfl:   Allergies   Allergen Reactions   • Simvastatin Cough   • Losartan Other (See Comments)     cough  Other reaction(s): (PIMS)         Labs:  Lab Results   Component Value Date    WBC 8.39 01/27/2022    HGB 15.0 01/27/2022    HCT 43.3 01/27/2022    MCV 94 01/27/2022     (L) 01/27/2022     Lab Results   Component Value Date    CALCIUM 10.1 02/18/2022    K 3.9 02/18/2022    CO2 26 02/18/2022     02/18/2022    BUN 27 (H) 02/18/2022    CREATININE 1.28 02/18/2022     Lab Results   Component Value Date    HGBA1C 7.0 (H) 07/12/2023       Review of Systems:  Review of Systems   Constitutional: Negative. Negative for activity change, appetite change, fatigue and fever. Respiratory: Negative for chest tightness, shortness of breath and wheezing. Cardiovascular: Negative for chest pain, palpitations and leg swelling. Gastrointestinal: Negative for nausea and vomiting.    Musculoskeletal: Negative for arthralgias and back pain. Neurological: Negative for dizziness, syncope, weakness and light-headedness. Hematological: Negative for adenopathy. All other systems reviewed and are negative. Physical Exam:  Physical Exam  Vitals and nursing note reviewed. Constitutional:       General: He is not in acute distress. Appearance: Normal appearance. He is not ill-appearing or diaphoretic. HENT:      Head: Normocephalic and atraumatic. Eyes:      Extraocular Movements: Extraocular movements intact. Cardiovascular:      Rate and Rhythm: Normal rate and regular rhythm. Heart sounds: No murmur heard. No friction rub. No gallop. Pulmonary:      Effort: Pulmonary effort is normal. No respiratory distress. Breath sounds: Normal breath sounds. Abdominal:      General: There is no distension. Palpations: Abdomen is soft. Musculoskeletal:         General: No swelling. Normal range of motion. Skin:     General: Skin is warm and dry. Capillary Refill: Capillary refill takes less than 2 seconds. Coloration: Skin is not pale. Neurological:      General: No focal deficit present. Mental Status: He is alert and oriented to person, place, and time. Cranial Nerves: No cranial nerve deficit.    Psychiatric:         Mood and Affect: Mood normal.         Behavior: Behavior normal.

## 2024-03-12 DIAGNOSIS — I25.119 CORONARY ARTERY DISEASE INVOLVING NATIVE CORONARY ARTERY OF NATIVE HEART WITH ANGINA PECTORIS (HCC): ICD-10-CM

## 2024-03-12 RX ORDER — ATORVASTATIN CALCIUM 40 MG/1
TABLET, FILM COATED ORAL
Qty: 90 TABLET | Refills: 3 | Status: SHIPPED | OUTPATIENT
Start: 2024-03-12

## 2024-04-15 ENCOUNTER — TELEPHONE (OUTPATIENT)
Age: 74
End: 2024-04-15

## 2024-04-15 NOTE — TELEPHONE ENCOUNTER
----- Message from Dany Prather sent at 4/15/2024 12:08 PM EDT -----  Regarding: excessive swelling  Contact: [phone]  Elo- this is enrique prather- i am on aleksandr's chart- this is a pic of the excessive swelling in his feet-- the pic is from today 5 minutes after getting up for the day- i am sure that he does not mention this in his visits but this is alarming- he also sleeps a lot- has relatively little activity on a daily basis--- please address this when he comes in for his next visit- which is upcoming

## 2024-04-16 ENCOUNTER — NURSE TRIAGE (OUTPATIENT)
Age: 74
End: 2024-04-16

## 2024-04-16 NOTE — TELEPHONE ENCOUNTER
Spoke with patient wife relayed  advised, patient verbally understood.    Please see  message below

## 2024-04-16 NOTE — TELEPHONE ENCOUNTER
"Reason for Disposition  • MODERATE swelling of both ankles (e.g., swelling extends up to the knees) AND new-onset or worsening    Answer Assessment - Initial Assessment Questions  1. ONSET: \"When did the swelling start?\" (e.g., minutes, hours, days)      Always had the swelling, has been worse now   2. LOCATION: \"What part of the leg is swollen?\"  \"Are both legs swollen or just one leg?\"      Bilaterally, picture sent  is the right foot.   3. SEVERITY: \"How bad is the swelling?\" (e.g., localized; mild, moderate, severe)   - Localized - small area of swelling localized to one leg   - MILD pedal edema - swelling limited to foot and ankle, pitting edema < 1/4 inch (6 mm) deep, rest and elevation eliminate most or all swelling    Pt does not elevate, pt refuses to elevate, looks like a sausage, where compression stocking   - MODERATE edema - swelling of lower leg to knee, pitting edema > 1/4 inch (6 mm) deep, rest and elevation only partially reduce swelling   - SEVERE edema - swelling extends above knee, facial or hand swelling present       Not severe  4. REDNESS: \"Does the swelling look red or infected?\"      Bright red, unsure if warm to touch   5. PAIN: \"Is the swelling painful to touch?\" If Yes, ask: \"How painful is it?\"   (Scale 1-10; mild, moderate or severe)      Wife states maybe, pt does up stairs backwards cause it pain. Wife does not able to give a direct pain scale, guessing 5   6. FEVER: \"Do you have a fever?\" If Yes, ask: \"What is it, how was it measured, and when did it start?\"       Denies   7. CAUSE: \"What do you think is causing the leg swelling?\"       Pt wife unsure   8. MEDICAL HISTORY: \"Do you have a history of heart failure, kidney disease, liver failure, or cancer?\"      Heart attack about 1.5 yr ago   9. RECURRENT SYMPTOM: \"Have you had leg swelling before?\" If Yes, ask: \"When was the last time?\" \"What happened that time?\"      Yes, swelling seems worse   10. OTHER SYMPTOMS: \"Do you have any " "other symptoms?\" (e.g., chest pain, difficulty breathing)        Denies   11. PREGNANCY: \"Is there any chance you are pregnant?\" \"When was your last menstrual period?\"        N/a    Protocols used: Leg Swelling and Edema-ADULT-[place]    "

## 2024-04-16 NOTE — TELEPHONE ENCOUNTER
Please schedule for follow up in one week with next available provider  If symptoms worsen, he should go to the ER  Thanks

## 2024-04-26 ENCOUNTER — OFFICE VISIT (OUTPATIENT)
Dept: CARDIOLOGY CLINIC | Facility: CLINIC | Age: 74
End: 2024-04-26
Payer: COMMERCIAL

## 2024-04-26 VITALS
HEART RATE: 66 BPM | WEIGHT: 280 LBS | SYSTOLIC BLOOD PRESSURE: 130 MMHG | RESPIRATION RATE: 16 BRPM | BODY MASS INDEX: 37.11 KG/M2 | DIASTOLIC BLOOD PRESSURE: 78 MMHG | HEIGHT: 73 IN | OXYGEN SATURATION: 94 %

## 2024-04-26 DIAGNOSIS — E66.9 OBESITY (BMI 30-39.9): ICD-10-CM

## 2024-04-26 DIAGNOSIS — I25.10 CAD S/P PERCUTANEOUS CORONARY ANGIOPLASTY: Primary | ICD-10-CM

## 2024-04-26 DIAGNOSIS — E78.2 MIXED HYPERLIPIDEMIA: ICD-10-CM

## 2024-04-26 DIAGNOSIS — I10 PRIMARY HYPERTENSION: ICD-10-CM

## 2024-04-26 DIAGNOSIS — I50.32 CHRONIC DIASTOLIC (CONGESTIVE) HEART FAILURE (HCC): ICD-10-CM

## 2024-04-26 DIAGNOSIS — Z98.61 CAD S/P PERCUTANEOUS CORONARY ANGIOPLASTY: Primary | ICD-10-CM

## 2024-04-26 PROCEDURE — 99214 OFFICE O/P EST MOD 30 MIN: CPT | Performed by: INTERNAL MEDICINE

## 2024-04-26 RX ORDER — MIRABEGRON 50 MG/1
50 TABLET, FILM COATED, EXTENDED RELEASE ORAL DAILY
COMMUNITY

## 2024-04-26 NOTE — PROGRESS NOTES
CARDIOLOGY OFFICE VISIT  [facility]  [address] [place], [place] [id number]  Tel: [phone]      NAME: Dany Shelton  AGE: 74 y.o.  SEX: male  : 1950  MRN: [id number]      Chief Complaint:  Chief Complaint   Patient presents with    Follow-up         History of Present Illness:   Patient comes for follow up. States he is doing well from cardiac stand point and denies chest pain / pressure, SOB, palpitations, lightheadedness, syncope, swelling feet, orthopnea, PND, claudication.    CAD s/p PCI with VIDA to mid RCA 2022 -  States is doing well cardiac wise with no cardiac symptoms.  Taking ASA, BB, statin regularly.    Chronic diastolic heart failure -currently not on a diuretic    Essential hypertension -  Has been hypertensive for many years.  Taking medications regularly.  Denies lightheadedness, headache, medication side effects.      Mixed hyperlipidemia -  Has had hyperlipidemia for many years.  Taking statin regularly along with diet control.  Denies myalgia.  PCP closely monitoring the blood work.    Obesity -states instead of losing weight, he has actually put on some weight      Past Medical History:  Past Medical History:   Diagnosis Date    Coronary artery disease     Diabetes mellitus (HCC)     Hypertension          Past Surgical History:  Past Surgical History:   Procedure Laterality Date    CARDIAC CATHETERIZATION N/A 2022    Procedure: Cardiac catheterization;  Surgeon: Shiva Valera MD;  Location: [facility];  Service: Cardiology    CARDIAC CATHETERIZATION N/A 2022    Procedure: Cardiac Coronary Angiogram;  Surgeon: Shiva Valera MD;  [facility];  Service: Cardiology    CARDIAC CATHETERIZATION Left 2022    Procedure: Cardiac Left Heart Cath;  Surgeon: Shiva Valera MD;  [facility];  Service: Cardiology    CARDIAC CATHETERIZATION N/A  1/26/2022    Procedure: Cardiac pci;  Surgeon: Shiva Valera MD;  [facility]: [facility];  Service: Cardiology    FL INJECTION RIGHT KNEE (ARTHROGRAM)  2018         Family History:  No family history on file.      Social History:  Social History     Socioeconomic History    Marital status: /Civil Union     Spouse name: None    Number of children: None    Years of education: None    Highest education level: None   Occupational History    None   Tobacco Use    Smoking status: Never    Smokeless tobacco: Never   Vaping Use    Vaping status: Never Used   Substance and Sexual Activity    Alcohol use: Not Currently    Drug use: Never    Sexual activity: Yes     Partners: Female   Other Topics Concern    None   Social History Narrative    None     Social Determinants of Health     Financial Resource Strain: Low Risk  (1/29/2024)    Received from [facility]    Overall Financial Resource Strain (CARDIA)     Difficulty of Paying Living Expenses: Not hard at all   Food Insecurity: No Food Insecurity (1/29/2024)    Received from [facility]    Hunger Vital Sign     Worried About Running Out of Food in the Last Year: Never true     Ran Out of Food in the Last Year: Never true   Transportation Needs: No Transportation Needs (1/29/2024)    Received from [facility]    PRAPARE - Transportation     Lack of Transportation (Medical): No     Lack of Transportation (Non-Medical): No   Physical Activity: Not on file   Stress: No Stress Concern Present (1/29/2024)    Received from [facility]    [employer] [place] of [employer] - Occupational Stress Questionnaire     Feeling of Stress : Not at all   Social Connections: Feeling Socially Integrated (1/29/2024)    Received from [facility]    OASIS : Social Isolation     How often do you feel lonely or isolated from those around you?: Never   Intimate Partner Violence: Not At  Risk (1/29/2024)    Received from [facility]    Humiliation, Afraid, Rape, and Kick questionnaire     Fear of Current or Ex-Partner: No     Emotionally Abused: No     Physically Abused: No     Sexually Abused: No   Housing Stability: Low Risk  (1/29/2024)    Received from [facility]    Housing Stability Vital Sign     Unable to Pay for Housing in the Last Year: No     Number of Places Lived in the Last Year: 1     Unstable Housing in the Last Year: No         Active Problems:  Patient Active Problem List   Diagnosis    NSTEMI (non-ST elevated myocardial infarction) ([facility])    Type 2 diabetes mellitus, without long-term current use of insulin ([facility])    BPH (benign prostatic hyperplasia)    KASEY (obstructive sleep apnea)    CKD (chronic kidney disease)    Obesity, morbid ([facility])    Coronary artery disease involving native coronary artery of native heart without angina pectoris         The following portions of the patient's history were reviewed and updated as appropriate: past medical history, past surgical history, past family history,  past social history, current medications, allergies and problem list.      Review of Systems:  Constitutional: Denies fever, chills  Eyes: Denies eye redness, eye discharge  ENT: Denies hearing loss, sneezing, nasal discharge, sore throat   Respiratory: Denies cough, expectoration, shortness of breath  Cardiovascular: Denies chest pain, palpitations, lower extremity swelling  Gastrointestinal: Denies abdominal pain, nausea, vomiting, diarrhea  Genito-Urinary: Denies dysuria, incontinence  Musculoskeletal: Denies back pain, joint pain, muscle pain  Neurologic: Denies lightheadedness, syncope, headache, seizures  Endocrine: Denies polydipsia, temperature intolerance  Allergy and Immunology: Denies hives, insect bite sensitivity  Hematological and Lymphatic: Denies bleeding problems, swollen glands   Psychological: Denies depression, suicidal ideation,  anxiety, panic  Dermatological: Denies pruritus, rash, skin lesion changes      Vitals:  Vitals:    04/26/24 0924   BP: 130/78   Pulse: 66   Resp: 16   SpO2: 94%       Body mass index is 36.94 kg/m².    Weight (last 2 days)       Date/Time Weight    04/26/24 0924 127 (280)              Physical Examination:  General: Patient is not in acute distress. Awake, alert, oriented in time, place and person. Responding to commands. Obese  Head: Normocephalic. Atraumatic  Eyes: Both pupils normal sized, round and reactive to light. Nonicteric  ENT: Normal external ear canals  Neck: Supple. JVP not raised. Trachea central. No thyromegaly  Lungs: Bilateral bronchovascular breath sounds with no crackles or rhonchi  Chest wall: No tenderness  Cardiovascular: RRR. S1 and S2 normal. No murmur, rub or gallop  Gastrointestinal: Abdomen soft, nontender. No guarding or rigidity. Liver and spleen not palpable. Bowel sounds present  Neurologic: Patient is awake, alert, oriented in time, place and person. Responding to commands. Moving all extremities  Integumentary:  No skin rash  Lymphatic: No cervical lymphadenopathy  Back: Symmetric. No CVA tenderness  Extremities: No clubbing, cyanosis or edema      Laboratory Results:  CBC with diff:   Lab Results   Component Value Date    WBC 8.39 01/27/2022    RBC 4.59 01/27/2022    HGB 15.0 01/27/2022    HGB 13.2 08/09/2018    HCT 43.3 01/27/2022    HCT 38.7 08/09/2018    MCV 94 01/27/2022    MCH 32.7 01/27/2022    RDW 12.5 01/27/2022     (L) 01/27/2022       CMP:  Lab Results   Component Value Date    CREATININE 1.14 01/19/2024    BUN 20 01/19/2024    K 4.4 01/19/2024     01/19/2024    CO2 30 01/19/2024    ALKPHOS 93 01/19/2024    ALT 26 01/19/2024    AST 18 01/19/2024    BILIDIR 0.2 07/12/2023       Lab Results   Component Value Date    HGBA1C 7.6 (H) 01/19/2024    MG 1.9 01/27/2022       Cardiac testing:   Results for orders placed during the hospital encounter of 01/26/22    Echo  complete w/ contrast if indicated    Interpretation Summary    Left Ventricle: Left ventricular cavity size is normal. The left ventricular ejection fraction is 60%. Systolic function is normal. Wall motion is otherwise normal. Diastolic function is normal. Wall thickness is increased. There is mild to moderate concentric hypertrophy.    Right Ventricle: Right ventricular cavity size is mildly dilated.    Mitral Valve: There is mild annular calcification.      Medications:    Current Outpatient Medications:     aspirin 81 mg chewable tablet, Chew 1 tablet (81 mg total) daily, Disp: 30 tablet, Rfl: 0    atorvastatin (LIPITOR) 40 mg tablet, TAKE 1 TABLET EVERY EVENING, Disp: 90 tablet, Rfl: 3    dorzolamide-timolol (COSOPT) 22.3-6.8 MG/ML ophthalmic solution, Apply 1 drop to eye, Disp: , Rfl:     metFORMIN (GLUCOPHAGE) 1000 MG tablet, Take 1,000 mg by mouth 2 (two) times a day, Disp: , Rfl:     metoprolol succinate (TOPROL-XL) 25 mg 24 hr tablet, Take 1 tablet (25 mg total) by mouth daily, Disp: 90 tablet, Rfl: 2    Mirabegron ER (Myrbetriq) 50 MG TB24, Take 50 mg by mouth in the morning, Disp: , Rfl:     Netarsudil-Latanoprost (Rocklatan) 0.02-0.005 % SOLN, Apply to eye, Disp: , Rfl:     oxybutynin (DITROPAN-XL) 10 MG 24 hr tablet, Take 10 mg by mouth daily, Disp: , Rfl:       Allergies:  Allergies   Allergen Reactions    Simvastatin Cough    Losartan Other (See Comments)     cough  Other reaction(s): (PIMS)           Assessment and Plan:  1. CAD S/P percutaneous coronary angioplasty  Doing well.  Continue aspirin 81 mg daily, atorvastatin 40 mg daily, metoprolol succinate 25 mg daily    2. Chronic diastolic (congestive) heart failure (HCC)  Low-salt diet discussed in detail.    3. Primary hypertension  BP stable.  Continue metoprolol succinate 25 mg daily    4. Mixed hyperlipidemia  Continue atorvastatin 40 mg daily along with diet control    Recommend aggressive risk factor modification and therapeutic lifestyle  changes.  Low-salt, low-calorie, low-fat, low-cholesterol diet with regular exercise and to optimize weight.  I will defer the ordering and monitoring of necessity lab studies to you, but I am available and happy to review and manage any of the data at your request in the future.    Discussed concepts of atherosclerosis, including signs and symptoms of cardiac disease.    Previous studies were reviewed.    Safety measures were reviewed.  Questions were entertained and answered.  Patient was advised to report any problems requiring medical attention.    Follow-up with PCP and appropriate specialist and lab work as discussed.    Return for follow up visit as scheduled or earlier, if needed.  Thank you for allowing me to participate in the care and evaluation of your patient.  Should you have any questions, please feel free to contact me.    Dayami Gonsalez MD  4/26/2024,10:04 AM

## 2024-05-17 ENCOUNTER — OFFICE VISIT (OUTPATIENT)
Dept: CARDIOLOGY CLINIC | Facility: CLINIC | Age: 74
End: 2024-05-17
Payer: COMMERCIAL

## 2024-05-17 VITALS
HEIGHT: 73 IN | OXYGEN SATURATION: 96 % | DIASTOLIC BLOOD PRESSURE: 80 MMHG | WEIGHT: 280 LBS | SYSTOLIC BLOOD PRESSURE: 140 MMHG | BODY MASS INDEX: 37.11 KG/M2 | HEART RATE: 82 BPM | RESPIRATION RATE: 16 BRPM

## 2024-05-17 DIAGNOSIS — I25.10 CORONARY ARTERY DISEASE INVOLVING NATIVE CORONARY ARTERY OF NATIVE HEART WITHOUT ANGINA PECTORIS: Primary | ICD-10-CM

## 2024-05-17 PROCEDURE — 99214 OFFICE O/P EST MOD 30 MIN: CPT | Performed by: INTERNAL MEDICINE

## 2024-05-17 NOTE — PROGRESS NOTES
Cardiology Follow Up    Dany Shelton  1950  [id number]  [facility] [place]  [address] [address] [phone]    Discussion/Summary:  CAD s/p PCI to RCA. Moderate LAD disease  Chronic diastolic heart failure - appears euvolemic on exam today  Hypertension  Dyslipidemia  DM    Cont with ASA, atorvastatin, and metoprolol  Doing well. No symptoms  Has mild pedal edema. He is not interested in taking a water pill.       Cont to exercise  Encouraged low salt dash diet    He will inform us with onset of cardiac symptoms    Previous studies were reviewed.    Safety measures were reviewed.  Questions were entertained and answered.  Patient was advised to report any problems requiring medical attention.    Follow-up with PCP and appropriate specialist and lab work as discussed.    Return for follow up visit as scheduled or earlier, if needed.    Thank you for allowing me to participate in the care and evaluation of your patient.  Should you have any questions, please feel free to contact me.      History of Present Illness:     Dany Shelton is a 74 y.o. male who presents for follow up for cardiovascular care.      Denies chest pain, chest pressure, shortness of breath, dizziness, lightheadedness, presyncope or syncope.  Denies orthopnea, PND or lower limb edema.    Uses a recumbent bike. Rides 8 miles every day for 30 minutes    He is not interested in taking a water pill    Patient Active Problem List   Diagnosis    NSTEMI (non-ST elevated myocardial infarction) (HCC)    Type 2 diabetes mellitus, without long-term current use of insulin (HCC)    BPH (benign prostatic hyperplasia)    KASEY (obstructive sleep apnea)    CKD (chronic kidney disease)    Obesity, morbid (HCC)    Coronary artery disease involving native coronary artery of native heart without angina pectoris     Past Medical History:    Diagnosis Date    Coronary artery disease     Diabetes mellitus (HCC)     Hypertension      Social History     Socioeconomic History    Marital status: /Civil Union     Spouse name: Not on file    Number of children: Not on file    Years of education: Not on file    Highest education level: Not on file   Occupational History    Not on file   Tobacco Use    Smoking status: Never    Smokeless tobacco: Never   Vaping Use    Vaping status: Never Used   Substance and Sexual Activity    Alcohol use: Not Currently    Drug use: Never    Sexual activity: Yes     Partners: Female   Other Topics Concern    Not on file   Social History Narrative    Not on file     Social Determinants of Health     Financial Resource Strain: Low Risk  (1/29/2024)    [facility]    Overall Financial Resource Strain (CARDIA)     Difficulty of Paying Living Expenses: Not hard at all   Food Insecurity: No Food Insecurity (1/29/2024)    [facility]    Hunger Vital Sign     Worried About Running Out of Food in the Last Year: Never true     Ran Out of Food in the Last Year: Never true   Transportation Needs: No Transportation Needs (1/29/2024)    [facility]    PRAPARE - Transportation     Lack of Transportation (Medical): No     Lack of Transportation (Non-Medical): No   Physical Activity: Not on file   Stress: No Stress Concern Present (1/29/2024)    [facility]    [place] [place] of Occupational Health - Occupational Stress Questionnaire     Feeling of Stress : Not at all   Social Connections: Feeling Socially Integrated (1/29/2024)    [facility]    OASIS : Social Isolation     How often do you feel lonely or isolated from those  around you?: Never   Intimate Partner Violence: Not At Risk (1/29/2024)    [facility]    Humiliation, Afraid, Rape, and Kick questionnaire     Fear of Current or Ex-Partner: No     Emotionally Abused: No     Physically Abused: No     Sexually Abused: No   Housing Stability: Low Risk  (1/29/2024)    [facility]    Housing Stability Vital Sign     Unable to Pay for Housing in the Last Year: No     Number of Places Lived in the Last Year: 1     Unstable Housing in the Last Year: No      No family history on file.  Past Surgical History:   Procedure Laterality Date    CARDIAC CATHETERIZATION N/A 1/26/2022    Procedure: Cardiac catheterization;  Surgeon: Shiva Valera MD;  [facility]: [facility];  Service: Cardiology    CARDIAC CATHETERIZATION N/A 1/26/2022    Procedure: Cardiac Coronary Angiogram;  Surgeon: Shiva Valera MD;  [facility];  Service: Cardiology    CARDIAC CATHETERIZATION Left 1/26/2022    Procedure: Cardiac Left Heart Cath;  Surgeon: Shiva Valera MD;  [facility];  Service: Cardiology    CARDIAC CATHETERIZATION N/A 1/26/2022    Procedure: Cardiac pci;  Surgeon: Shiva Valera MD;  [facility];  Service: Cardiology    FL INJECTION RIGHT KNEE (ARTHROGRAM)  2018       Current Outpatient Medications:     aspirin 81 mg chewable tablet, Chew 1 tablet (81 mg total) daily, Disp: 30 tablet, Rfl: 0    atorvastatin (LIPITOR) 40 mg tablet, TAKE 1 TABLET EVERY EVENING, Disp: 90 tablet, Rfl: 3    dorzolamide-timolol (COSOPT) 22.3-6.8 MG/ML ophthalmic solution, Apply 1 drop to eye, Disp: , Rfl:     metFORMIN (GLUCOPHAGE) 1000 MG tablet, Take 1,000 mg by mouth 2 (two) times a day, Disp: , Rfl:     metoprolol succinate (TOPROL-XL) 25 mg 24 hr tablet, Take 1 tablet (25 mg total) by mouth daily, Disp: 90 tablet, Rfl: 2     Mirabegron ER (Myrbetriq) 50 MG TB24, Take 50 mg by mouth in the morning, Disp: , Rfl:     Netarsudil-Latanoprost (Rocklatan) 0.02-0.005 % SOLN, Apply to eye, Disp: , Rfl:     oxybutynin (DITROPAN-XL) 10 MG 24 hr tablet, Take 10 mg by mouth daily, Disp: , Rfl:   Allergies   Allergen Reactions    Simvastatin Cough    Losartan Other (See Comments)     cough  Other reaction(s): (PIMS)         Labs:  Lab Results   Component Value Date    WBC 8.39 01/27/2022    HGB 15.0 01/27/2022    HCT 43.3 01/27/2022    MCV 94 01/27/2022     (L) 01/27/2022     Lab Results   Component Value Date    CALCIUM 9.5 01/19/2024    K 4.4 01/19/2024    CO2 30 01/19/2024     01/19/2024    BUN 20 01/19/2024    CREATININE 1.14 01/19/2024     Lab Results   Component Value Date    HGBA1C 7.6 (H) 01/19/2024       Review of Systems:  Review of Systems   Constitutional: Negative.  Negative for activity change, appetite change, fatigue and fever.   Respiratory:  Negative for chest tightness, shortness of breath and wheezing.    Cardiovascular:  Negative for chest pain, palpitations and leg swelling.   Gastrointestinal:  Negative for nausea and vomiting.   Musculoskeletal:  Negative for arthralgias and back pain.   Neurological:  Negative for dizziness, syncope, weakness and light-headedness.   Hematological:  Negative for adenopathy.   All other systems reviewed and are negative.      Physical Exam:  Physical Exam  Vitals and nursing note reviewed.   Constitutional:       General: He is not in acute distress.     Appearance: Normal appearance. He is not ill-appearing or diaphoretic.   HENT:      Head: Normocephalic and atraumatic.   Eyes:      Extraocular Movements: Extraocular movements intact.   Cardiovascular:      Rate and Rhythm: Normal rate and regular rhythm.      Heart sounds: No murmur heard.     No friction rub. No gallop.   Pulmonary:      Effort: Pulmonary effort is normal. No respiratory distress.      Breath sounds: Normal  breath sounds.   Abdominal:      General: There is no distension.      Palpations: Abdomen is soft.   Musculoskeletal:         General: No swelling. Normal range of motion.   Skin:     General: Skin is warm and dry.      Capillary Refill: Capillary refill takes less than 2 seconds.      Coloration: Skin is not pale.   Neurological:      General: No focal deficit present.      Mental Status: He is alert and oriented to person, place, and time.      Cranial Nerves: No cranial nerve deficit.   Psychiatric:         Mood and Affect: Mood normal.         Behavior: Behavior normal.

## 2024-05-24 DIAGNOSIS — I25.119 CORONARY ARTERY DISEASE INVOLVING NATIVE CORONARY ARTERY OF NATIVE HEART WITH ANGINA PECTORIS (HCC): ICD-10-CM

## 2024-05-24 RX ORDER — METOPROLOL SUCCINATE 25 MG/1
25 TABLET, EXTENDED RELEASE ORAL DAILY
Qty: 90 TABLET | Refills: 1 | Status: SHIPPED | OUTPATIENT
Start: 2024-05-24

## 2024-10-14 DIAGNOSIS — I25.119 CORONARY ARTERY DISEASE INVOLVING NATIVE CORONARY ARTERY OF NATIVE HEART WITH ANGINA PECTORIS (HCC): ICD-10-CM

## 2024-10-15 RX ORDER — METOPROLOL SUCCINATE 25 MG/1
25 TABLET, EXTENDED RELEASE ORAL DAILY
Qty: 90 TABLET | Refills: 1 | Status: SHIPPED | OUTPATIENT
Start: 2024-10-15

## 2025-07-15 ENCOUNTER — OFFICE VISIT (OUTPATIENT)
Dept: CARDIOLOGY CLINIC | Facility: CLINIC | Age: 75
End: 2025-07-15
Payer: COMMERCIAL

## 2025-07-15 VITALS
RESPIRATION RATE: 16 BRPM | HEART RATE: 81 BPM | BODY MASS INDEX: 36.94 KG/M2 | OXYGEN SATURATION: 98 % | SYSTOLIC BLOOD PRESSURE: 130 MMHG | DIASTOLIC BLOOD PRESSURE: 78 MMHG | HEIGHT: 73 IN

## 2025-07-15 DIAGNOSIS — I50.32 CHRONIC HEART FAILURE WITH PRESERVED EJECTION FRACTION (HFPEF) (HCC): ICD-10-CM

## 2025-07-15 DIAGNOSIS — I10 PRIMARY HYPERTENSION: ICD-10-CM

## 2025-07-15 DIAGNOSIS — E78.2 MIXED HYPERLIPIDEMIA: ICD-10-CM

## 2025-07-15 DIAGNOSIS — E66.9 OBESITY (BMI 30-39.9): ICD-10-CM

## 2025-07-15 DIAGNOSIS — I25.10 CAD S/P PERCUTANEOUS CORONARY ANGIOPLASTY: Primary | ICD-10-CM

## 2025-07-15 DIAGNOSIS — Z98.61 CAD S/P PERCUTANEOUS CORONARY ANGIOPLASTY: Primary | ICD-10-CM

## 2025-07-15 PROCEDURE — 99214 OFFICE O/P EST MOD 30 MIN: CPT | Performed by: INTERNAL MEDICINE

## 2025-08-18 ENCOUNTER — TELEPHONE (OUTPATIENT)
Age: 75
End: 2025-08-18

## (undated) DEVICE — CATH GUIDE LAUNCHER 6FR JR 4.0

## (undated) DEVICE — DGW .035 FC J3MM 260CM TEF: Brand: EMERALD

## (undated) DEVICE — TREK CORONARY DILATATION CATHETER 3.0 MM X 15 MM / RAPID-EXCHANGE: Brand: TREK

## (undated) DEVICE — CATH GUIDE LAUNCHER 6FR EBU 3.5

## (undated) DEVICE — GLIDESHEATH SLENDER STAINLESS STEEL KIT: Brand: GLIDESHEATH SLENDER

## (undated) DEVICE — HI-TORQUE BALANCE MIDDLEWEIGHT GUIDE WIRE W/HYDROCOAT .014 J TIP 3.0 CM X 190 CM: Brand: HI-TORQUE BALANCE MIDDLEWEIGHT

## (undated) DEVICE — Device: Brand: OMNIWIRE PRESSURE GUIDE WIRE

## (undated) DEVICE — GUIDEWIRE WHOLEY HI TORQUE INTERM MOD J.035 260CM

## (undated) DEVICE — NEEDLE PERCUTANEOUS 21G X 4CM

## (undated) DEVICE — RADIFOCUS OPTITORQUE ANGIOGRAPHIC CATHETER: Brand: OPTITORQUE

## (undated) DEVICE — GUIDELINER CATHETERS ARE INTENDED TO BE USED IN CONJUNCTION WITH GUIDE CATHETERS TO ACCESS DISCRETE REGIONS OF THE CORONARY AND/OR PERIPHERAL VASCULATURE, AND TO FACILITATE PLACEMENT OF INTERVENTIONAL DEVICES.: Brand: GUIDELINER® V3 CATHETER

## (undated) DEVICE — RUNTHROUGH NS EXTRA FLOPPY PTCA GUIDEWIRE: Brand: RUNTHROUGH